# Patient Record
Sex: FEMALE | Race: BLACK OR AFRICAN AMERICAN | Employment: OTHER | URBAN - METROPOLITAN AREA
[De-identification: names, ages, dates, MRNs, and addresses within clinical notes are randomized per-mention and may not be internally consistent; named-entity substitution may affect disease eponyms.]

---

## 2017-06-30 ENCOUNTER — OFFICE VISIT (OUTPATIENT)
Dept: FAMILY MEDICINE CLINIC | Age: 67
End: 2017-06-30

## 2017-06-30 VITALS
OXYGEN SATURATION: 100 % | BODY MASS INDEX: 26.4 KG/M2 | HEIGHT: 63 IN | DIASTOLIC BLOOD PRESSURE: 92 MMHG | SYSTOLIC BLOOD PRESSURE: 162 MMHG | RESPIRATION RATE: 18 BRPM | WEIGHT: 149 LBS | TEMPERATURE: 95.5 F | HEART RATE: 81 BPM

## 2017-06-30 DIAGNOSIS — D23.4 CYST, DERMOID, SCALP AND NECK: ICD-10-CM

## 2017-06-30 DIAGNOSIS — Z00.00 HEALTH CARE MAINTENANCE: ICD-10-CM

## 2017-06-30 DIAGNOSIS — M43.6 TORTICOLLIS, ACUTE: ICD-10-CM

## 2017-06-30 DIAGNOSIS — H91.8X2 OTHER SPECIFIED HEARING LOSS OF LEFT EAR, UNSPECIFIED HEARING STATUS ON CONTRALATERAL SIDE: ICD-10-CM

## 2017-06-30 DIAGNOSIS — I10 ESSENTIAL HYPERTENSION: Primary | ICD-10-CM

## 2017-06-30 DIAGNOSIS — R31.29 MICROSCOPIC HEMATURIA: ICD-10-CM

## 2017-06-30 DIAGNOSIS — E78.2 MIXED HYPERLIPIDEMIA: ICD-10-CM

## 2017-06-30 DIAGNOSIS — E55.9 VITAMIN D INSUFFICIENCY: ICD-10-CM

## 2017-06-30 PROBLEM — K76.0 HEPATIC STEATOSIS: Status: ACTIVE | Noted: 2017-06-30

## 2017-06-30 PROBLEM — H93.11 RIGHT-SIDED TINNITUS: Status: ACTIVE | Noted: 2017-04-19

## 2017-06-30 PROBLEM — K57.90 DIVERTICULAR DISEASE: Status: ACTIVE | Noted: 2017-06-30

## 2017-06-30 PROBLEM — D25.9 UTERINE FIBROID: Status: ACTIVE | Noted: 2017-06-30

## 2017-06-30 PROBLEM — H91.92 HEARING LOSS, LEFT: Status: ACTIVE | Noted: 2017-01-01

## 2017-06-30 PROBLEM — N28.9 RENAL DISEASE: Status: ACTIVE | Noted: 2017-06-30

## 2017-06-30 PROBLEM — K63.5 COLON POLYP: Status: ACTIVE | Noted: 2017-06-30

## 2017-06-30 LAB
BILIRUB UR QL STRIP: NEGATIVE
GLUCOSE UR-MCNC: NEGATIVE MG/DL
KETONES P FAST UR STRIP-MCNC: NEGATIVE MG/DL
PH UR STRIP: 6.5 [PH] (ref 4.6–8)
PROT UR QL STRIP: NEGATIVE MG/DL
SP GR UR STRIP: 1.01 (ref 1–1.03)
UA UROBILINOGEN AMB POC: NORMAL (ref 0.2–1)
URINALYSIS CLARITY POC: CLEAR
URINALYSIS COLOR POC: YELLOW
URINE BLOOD POC: NORMAL
URINE LEUKOCYTES POC: NEGATIVE
URINE NITRITES POC: NEGATIVE

## 2017-06-30 RX ORDER — DIAZEPAM 5 MG/1
TABLET ORAL
Refills: 0 | COMMUNITY
Start: 2017-06-27 | End: 2017-07-28

## 2017-06-30 RX ORDER — LISINOPRIL 10 MG/1
TABLET ORAL
Refills: 1 | COMMUNITY
Start: 2017-04-19 | End: 2017-12-04 | Stop reason: SDUPTHER

## 2017-06-30 RX ORDER — ACETAMINOPHEN 500 MG/1
TABLET, FILM COATED ORAL
Refills: 1 | COMMUNITY
Start: 2017-06-08 | End: 2017-07-28

## 2017-06-30 RX ORDER — HYDROCODONE BITARTRATE AND ACETAMINOPHEN 5; 325 MG/1; MG/1
TABLET ORAL
Refills: 0 | COMMUNITY
Start: 2017-06-19 | End: 2017-07-28

## 2017-06-30 RX ORDER — ACETAMINOPHEN 500 MG
500 TABLET ORAL 2 TIMES DAILY
Qty: 40 TAB | Refills: 0 | Status: SHIPPED | OUTPATIENT
Start: 2017-06-30

## 2017-06-30 RX ORDER — CYCLOBENZAPRINE HCL 10 MG
5-10 TABLET ORAL
Qty: 30 TAB | Refills: 0 | Status: SHIPPED | OUTPATIENT
Start: 2017-06-30 | End: 2017-12-12

## 2017-06-30 RX ORDER — CLINDAMYCIN HYDROCHLORIDE 300 MG/1
CAPSULE ORAL
Refills: 0 | COMMUNITY
Start: 2017-06-16 | End: 2017-06-30

## 2017-06-30 NOTE — PROGRESS NOTES
INTERNAL MEDICINE INITIAL PROVIDER VISIT    SUBJECTIVE:     Chief Complaint   Patient presents with    Establish Care    Stiff Neck       HPI: 79 y.o. female with PMHx significant for HTN is here for the above chief complaint(s). Establish Care: Moving here because of location convenience. Hypertension: Today BP is not controlled, did not lisinopril 10 mg daily. Medication past good compliance. No side effects from medications. Denies headache, lightheadedness, dizziness, vision changes, chest pain, rapid/irregular heart rate, shortness of breath, cough, abdominal pain, leg pain. No h/o leg swelling. Diet and exercises working on with decreased salt. Working on exercise. Neck Pain:  Seen by dentist and had deep cleaning, and abscess in upper maxillary teeth cleaned out 11 days prior. After procedure given antibiotics and norco. A few days later went to gym ldid machines for arms and feet paddling. The following day woke up with neck stiffness on right side and then shifted to left side, constant pain. Worse with neck movements and bending forward. Pain today is a little better compared to day of onset. Improved with norco and cool sensation. ER visit 2 days ago, had CT of head significant for ethmoid disease on left (no facial fullness), diagnosed with torticollis. HCM: Hep C, Tdap vaccine possibly due, will request records. ROS:  · General: negative for - chills, fever, weight changes or malaise, night sweats. · HEENT: negative for- sore throat, nasal congestion,  vision problems or ear problems. · Neck: negative for- neck swelling, trouble swallowing, lymph node enlargement. · Respiratory: negative for- cough, shortness of breath, or wheezing. · Cardiovascular: negative for- chest pain, palpitations, or dyspnea on exertion; no orthopnea or PND. · Gastrointestinal: negative for- abdominal pain, N/V, change in bowel habits,  black or bloody stools, constipation or diarrhea. · Genitourinary: negative for- dysuria, hematuria, frequency, urgency, nocturia, incontinence. · Skin: negative for- new rashes or lesions. · Hematology: negative for- easy bruising or bleeding. · Musculoskeletal: negative for- back pain, joint pain, joint stiffness, muscle pain or muscle weakness. Positive for Neck pain and stiffness  · Neurological: negative for- numbness, tingling, headache or dizziness. Positive for finger tingling with water hot/cold, improved after not in water. No finger color change  · Psychological: negative for - anxiety, depression, sleep disturbances, suicidal or homicidal ideations. Current Outpatient Prescriptions   Medication Sig    MAPAP EXTRA STRENGTH 500 mg tablet TK 1 T PO Q 4 H PRF PAIN    diazePAM (VALIUM) 5 mg tablet TK 1 T PO Q 6 H PRF MUSCLE SPASM FOR UP TO 10 DAYS. NO DRIVING WHILE TAKING THIS MEDICATION    HYDROcodone-acetaminophen (NORCO) 5-325 mg per tablet TK 1 T PO  Q 4 TO 6 H PRN FOR PAIN    lisinopril (PRINIVIL, ZESTRIL) 10 mg tablet TK 1 T PO QD    Calcium-Cholecalciferol, D3, (CALCIUM 600 WITH VITAMIN D3) 600 mg(1,500mg) -400 unit chew Take  by mouth.  COD LIVER OIL, BULK, by Does Not Apply route.  cyclobenzaprine (FLEXERIL) 10 mg tablet Take 0.5-1 Tabs by mouth three (3) times daily as needed for Muscle Spasm(s).  acetaminophen (TYLENOL) 500 mg tablet Take 1 Tab by mouth two (2) times a day.  simvastatin (ZOCOR) 20 mg tablet Take  by mouth nightly. No current facility-administered medications for this visit.       Health Maintenance   Topic Date Due    Hepatitis C Screening  1950    DTaP/Tdap/Td series (1 - Tdap) 06/22/1971    BREAST CANCER SCRN MAMMOGRAM  06/22/2000    ZOSTER VACCINE AGE 60>  06/22/2010    GLAUCOMA SCREENING Q2Y  06/22/2015    OSTEOPOROSIS SCREENING (DEXA)  06/22/2015    Pneumococcal 65+ Low/Medium Risk (1 of 2 - PCV13) 06/22/2015    MEDICARE YEARLY EXAM  06/22/2015    INFLUENZA AGE 9 TO ADULT 08/01/2017    COLONOSCOPY  09/14/2020       Medications and Allergies: Reviewed and confirmed in the chart    Past Medical Hx: Reviewed and confirmed in the chart  Past Medical History:   Diagnosis Date    Environmental allergies     Hearing loss, left 2017    has hearing aid    HLD (hyperlipidemia) 2010    HTN (hypertension)     Microscopic hematuria 2013    Osteoporosis 2014    Vitamin D insufficiency 2010     Family Hx, Surgical Hx, Social Hx: Reviewed and updated in EMR    OBJECTIVE:  Vitals:    06/30/17 0827 06/30/17 0847   BP: (!) 185/95 (!) 162/92   Pulse: 81    Resp: 18    Temp: 95.5 °F (35.3 °C)    TempSrc: Oral    SpO2: 100%    Weight: 149 lb (67.6 kg)    Height: 5' 3\" (1.6 m)      General: Pleasant  woman in no acute distress  HEENT: Head is atraumatic normo-cephalic. Pupils equally round and reactive to light, extraocular muscle intact, conjunctiva clear, non-icterus. Ears bilaterally normal TM, no erythema or exudate, normal light reflex. Nares bilaterally mucosal membranes moist, no erythema. Nasal turbinates non-erythematous, non-boggy. Oral mucosa moist without erythema, ulceration. Fair dentition. At site of previous abscess no pain, swelling, erythema or pain. Neck: Supple, no LAD, no palpable thyromegaly. CVS: No appreciable elevated JVD. Heart regular, rate, and rhythm. Audible S1 and S2. No murmurs, rubs or gallops. PULM: Lungs clear to auscultation bilaterally. No wheezes, rales or rhonchi. MSK:    NECK: ROM limited forward flexion, right side bend, left side bend 2/2 pain. Neck strength 5/5. No TTP c spine or paraspinal muclses. Small cysts inferior left scalp, slight tender to palpation. Bilateral UE: Strength, sensation and reflexes intact upper extremities proximal and distal muscle groups and symmetric  GI: Positive bowel sounds, soft, nondistended, non-tender. EXT: 2+ dorsalis pedis pulses bilaterally.  No pedal edema bilaterally  Neuro: Alert and oriented x 3. Gait wnl. MSE: Mood euthymic, affect congruent       Past records reviewed. Nursing Notes Reviewed       ASSESSMENT AND PLAN    ICD-10-CM ICD-9-CM    1. Essential hypertension I10 401.9 Continue lisinopril 10mg  Discussed diet/exercise  RTC in 4 weeks  YANCI signed for prior PCPs     2. Torticollis, acute M43.6 723.5 C spine XRAY  cyclobenzaprine (FLEXERIL) 10 mg tablet, discussed r/b/se  Cut down on norco      acetaminophen (TYLENOL) 500 mg tablet BID  Exercise handout given  If no improvement considerPT  If no improvement consider cysts removal   3. Microscopic hematuria R31.29 599.72 AMB POC URINALYSIS DIP STICK AUTO W/ MICRO      CYTOLOGY NON-GYN if persistent hematuria   4. Vitamin D insufficiency E55.9 268.9  YANCI signed for old labs   5. Mixed hyperlipidemia E78.2 272.2  YANCI signed for old labs   6. Health care maintenance Z00.00 V70.0  YANCI signed for HCM updates   7. Other specified hearing loss of left ear, unspecified hearing status on contralateral side H91.8X2 389.8  Continue to monitor   8. Cyst, dermoid, scalp and neck D23.4 216.4 Continue to monitor       Orders Placed This Encounter    AMB POC URINALYSIS DIP STICK AUTO W/ MICRO    MAPAP EXTRA STRENGTH 500 mg tablet    DISCONTD: clindamycin (CLEOCIN) 300 mg capsule    diazePAM (VALIUM) 5 mg tablet    HYDROcodone-acetaminophen (NORCO) 5-325 mg per tablet    lisinopril (PRINIVIL, ZESTRIL) 10 mg tablet    Calcium-Cholecalciferol, D3, (CALCIUM 600 WITH VITAMIN D3) 600 mg(1,500mg) -400 unit chew    COD LIVER OIL, BULK,    cyclobenzaprine (FLEXERIL) 10 mg tablet    acetaminophen (TYLENOL) 500 mg tablet     AVS printed and provided to patient    Assessment and plan above discussed with patient, patient voiced understanding and agreement with plan. More than 50% of this 60 min visit was spent counseling the patient face to face about HTN, neck strain/muscle Angelia Young M.D.   [address] [address] [address] [place], [address] [place] [address], [address] [phone]  [place] [phone]

## 2017-06-30 NOTE — PATIENT INSTRUCTIONS
For Neck Pain:  Okay to take norco, try to take 1 to 2 tabs per day  Try to take tylenol 500 mg twice a day for next 2 weeks  Start taking flexeril 5-10 mg as needed for muscle strain, for pain  Do not take vallium  Do exercises below to strengthen your neck  Try to get a massage    We are getting your records. We will order blood test after we receive your records. For blood pressure; Take pills every day  Continue to limit salt in diet, less than 1 gram per 24hrs  Continue to work on exercise after your neck pain improves  Continue to drink water daily, goal 2 liters per day    Return in 4 weeks      Torticollis: Care Instructions  Your Care Instructions  Torticollis is a severe tightness of the muscles on one side of the neck. The tight muscles can make the head turn to one side, lean to one side, or be pulled forward or backward. It is also called wryneck. Your doctor asked questions about your health and examined you. You may also have had X-rays or other tests. If your doctor thinks another medical problem is causing your tight neck muscles, you may need more tests. Torticollis usually gets better with home care. Your doctor may have you take medicine to relieve pain or relax your muscles. He or she may suggest exercise and physical therapy to help increase flexibility and relieve stress. Your doctor may also have you wear a special collar, called a cervical collar, for a day or two. The collar may help make your neck more comfortable. Follow-up care is a key part of your treatment and safety. Be sure to make and go to all appointments, and call your doctor if you are having problems. It's also a good idea to know your test results and keep a list of the medicines you take. How can you care for yourself at home? · Be safe with medicines. Read and follow all instructions on the label. ¨ If the doctor gave you a prescription medicine for pain, take it as prescribed.   ¨ If you are not taking a prescription pain medicine, ask your doctor if you can take an over-the-counter medicine. · Try using a heating pad on a low or medium setting for 15 to 20 minutes every 2 or 3 hours. Try a warm shower in place of one session with the heating pad. · Try using an ice pack for 10 to 15 minutes every 2 to 3 hours. Put a thin cloth between the ice and your skin. · If your doctor recommends a cervical collar, wear it exactly as directed. When should you call for help? Call your doctor now or seek immediate medical care if:  · You have new or worse numbness in your arms, buttocks, or legs. · You have new or worse weakness in your arms or legs. · Your neck pain gets worse. · You lose bladder or bowel control. Watch closely for changes in your health, and be sure to contact your doctor if:  · You do not get better as expected. Where can you learn more? Go to http://james-anirudh.info/. Enter L899 in the search box to learn more about \"Torticollis: Care Instructions. \"  Current as of: March 21, 2017  Content Version: 11.3  © 2845-4173 [employer]. Care instructions adapted under license by [employer] (which disclaims liability or warranty for this information). If you have questions about a medical condition or this instruction, always ask your healthcare professional. [address] any warranty or liability for your use of this information. Neck Strain or Sprain: Rehab Exercises  Your Care Instructions  Here are some examples of typical rehabilitation exercises for your condition. Start each exercise slowly. Ease off the exercise if you start to have pain. Your doctor or physical therapist will tell you when you can start these exercises and which ones will work best for you. How to do the exercises  Neck rotation    1. Sit in a firm chair, or stand up straight.   2. Keeping your chin level, turn your head to the right, and hold for 15 to 30 seconds. 3. Turn your head to the left and hold for 15 to 30 seconds. 4. Repeat 2 to 4 times to each side. Neck stretches    1. Look straight ahead, and tip your right ear to your right shoulder. Do not let your left shoulder rise up as you tip your head to the right. 2. Hold for 15 to 30 seconds. 3. Tilt your head to the left. Do not let your right shoulder rise up as you tip your head to the left. 4. Hold for 15 to 30 seconds. 5. Repeat 2 to 4 times to each side. Forward neck flexion    1. Sit in a firm chair, or stand up straight. 2. Bend your head forward. 3. Hold for 15 to 30 seconds. 4. Repeat 2 to 4 times. Lateral (side) bend strengthening    1. With your right hand, place your first two fingers on your right temple. 2. Start to bend your head to the side while using gentle pressure from your fingers to keep your head from bending. 3. Hold for about 6 seconds. 4. Repeat 8 to 12 times. 5. Switch hands and repeat the same exercise on your left side. Forward bend strengthening    1. Place your first two fingers of either hand on your forehead. 2. Start to bend your head forward while using gentle pressure from your fingers to keep your head from bending. 3. Hold for about 6 seconds. 4. Repeat 8 to 12 times. Neutral position strengthening    1. Using one hand, place your fingertips on the back of your head at the top of your neck. 2. Start to bend your head backward while using gentle pressure from your fingers to keep your head from bending. 3. Hold for about 6 seconds. 4. Repeat 8 to 12 times. Chin tuck    1. Lie on the floor with a rolled-up towel under your neck. Your head should be touching the floor. 2. Slowly bring your chin toward your chest.  3. Hold for a count of 6, and then relax for up to 10 seconds. 4. Repeat 8 to 12 times. Follow-up care is a key part of your treatment and safety.  Be sure to make and go to all appointments, and call your doctor if you are having problems. It's also a good idea to know your test results and keep a list of the medicines you take. Where can you learn more? Go to http://james-anirudh.info/. Enter M679 in the search box to learn more about \"Neck Strain or Sprain: Rehab Exercises. \"  Current as of: March 21, 2017  Content Version: 11.3  © 3937-7703 [employer]. Care instructions adapted under license by [employer] (which disclaims liability or warranty for this information). If you have questions about a medical condition or this instruction, always ask your healthcare professional. [place] [address] any warranty or liability for your use of this information.

## 2017-06-30 NOTE — PROGRESS NOTES
Patient is here establish care with new pcp. She states she had her tooth pulled last week and she states her neck feels funny. 1. Have you been to the ER, urgent care clinic since your last visit? Hospitalized since your last visit? Yes, for stiff neck, patient was seen [facility], care everywhere is open. 2. Have you seen or consulted any other health care providers outside of the [address] [place] [address] since your last visit? Include any pap smears or colon screening.  no

## 2017-06-30 NOTE — MR AVS SNAPSHOT
Visit Information Date & Time Provider Department Dept. Phone Encounter #  
 6/30/2017  8:30 AM Constantine Herzog MD [address] [phone] [id number] Follow-up Instructions Return in about 1 month (around 7/30/2017), or if symptoms worsen or fail to improve, for HTN, neck pain, HCM. Upcoming Health Maintenance Date Due Hepatitis C Screening 1950 DTaP/Tdap/Td series (1 - Tdap) 6/22/1971 BREAST CANCER SCRN MAMMOGRAM 6/22/2000 ZOSTER VACCINE AGE 60> 6/22/2010 GLAUCOMA SCREENING Q2Y 6/22/2015 OSTEOPOROSIS SCREENING (DEXA) 6/22/2015 Pneumococcal 65+ Low/Medium Risk (1 of 2 - PCV13) 6/22/2015 MEDICARE YEARLY EXAM 6/22/2015 INFLUENZA AGE 9 TO ADULT 8/1/2017 COLONOSCOPY 9/14/2020 Allergies as of 6/30/2017  Review Complete On: 6/30/2017 By: Jesús Hugo LPN Severity Noted Reaction Type Reactions Alendronate High 02/23/2016    Anaphylaxis Will get more information, patient reports no h/o allergy Current Immunizations  Never Reviewed No immunizations on file. Not reviewed this visit You Were Diagnosed With   
  
 Codes Comments Essential hypertension    -  Primary ICD-10-CM: I10 
ICD-9-CM: 401.9 Torticollis, acute     ICD-10-CM: M43.6 ICD-9-CM: 723.5 Microscopic hematuria     ICD-10-CM: R31.29 ICD-9-CM: 599.72 Vitamin D insufficiency     ICD-10-CM: E55.9 ICD-9-CM: 268.9 Mixed hyperlipidemia     ICD-10-CM: E78.2 ICD-9-CM: 272.2 Health care maintenance     ICD-10-CM: Z00.00 ICD-9-CM: V70.0 Other specified hearing loss of left ear, unspecified hearing status on contralateral side     ICD-10-CM: H91.8X2 ICD-9-CM: 321. 8 Cyst, dermoid, scalp and neck     ICD-10-CM: D23.4 ICD-9-CM: 216.4 Vitals BP Pulse Temp Resp Height(growth percentile) Weight(growth percentile) (!) 162/92 81 95.5 °F (35.3 °C) (Oral) 18 5' 3\" (1.6 m) 149 lb (67.6 kg) SpO2 BMI OB Status Smoking Status 100% 26.39 kg/m2 Postmenopausal Former Smoker Vitals History BMI and BSA Data Body Mass Index Body Surface Area  
 26.39 kg/m 2 1.73 m 2 Preferred Pharmacy Pharmacy Name Phone Mirian Canales [address], [address] [phone] Your Updated Medication List  
  
   
This list is accurate as of: 6/30/17  9:36 AM.  Always use your most recent med list.  
  
  
  
  
 CALCIUM 600 WITH VITAMIN D3 600 mg(1,500mg) -400 unit Chew Generic drug:  Calcium-Cholecalciferol (D3) Take  by mouth. COD LIVER OIL (BULK)  
by Does Not Apply route. cyclobenzaprine 10 mg tablet Commonly known as:  FLEXERIL Take 0.5-1 Tabs by mouth three (3) times daily as needed for Muscle Spasm(s). diazePAM 5 mg tablet Commonly known as:  VALIUM TK 1 T PO Q 6 H PRF MUSCLE SPASM FOR UP TO 10 DAYS. NO DRIVING WHILE TAKING THIS MEDICATION  
  
 HYDROcodone-acetaminophen 5-325 mg per tablet Commonly known as:  Delilah Safe TK 1 T PO  Q 4 TO 6 H PRN FOR PAIN  
  
 lisinopril 10 mg tablet Commonly known as:  Rich[place] Moulder TK 1 T PO QD  
  
 MAPAP EXTRA STRENGTH 500 mg tablet Generic drug:  acetaminophen TK 1 T PO Q 4 H PRF PAIN  
  
 simvastatin 20 mg tablet Commonly known as:  ZOCOR Take  by mouth nightly. Prescriptions Sent to Pharmacy Refills  
 cyclobenzaprine (FLEXERIL) 10 mg tablet 0 Sig: Take 0.5-1 Tabs by mouth three (3) times daily as needed for Muscle Spasm(s). Class: Normal  
 Pharmacy: [facility] [address], [address] Ph #: [phone] Route: Oral  
  
We Performed the Following AMB POC URINALYSIS DIP STICK AUTO W/ MICRO [30076 CPT(R)] Follow-up Instructions  Return in about 1 month (around 7/30/2017), or if symptoms worsen or fail to improve, for HTN, neck pain, HCM. Patient Instructions For Neck Pain: 
Okay to take norco, try to take 1 to 2 tabs per day Try to take tylenol 500 mg twice a day for next 2 weeks Start taking flexeril 5-10 mg as needed for muscle strain, for pain Do not take vallium Do exercises below to strengthen your neck Try to get a massage We are getting your records. We will order blood test after we receive your records. For blood pressure; Take pills every day Continue to limit salt in diet, less than 1 gram per 24hrs Continue to work on exercise after your neck pain improves Continue to drink water daily, goal 2 liters per day Return in 4 weeks Torticollis: Care Instructions Your Care Instructions Torticollis is a severe tightness of the muscles on one side of the neck. The tight muscles can make the head turn to one side, lean to one side, or be pulled forward or backward. It is also called wryneck. Your doctor asked questions about your health and examined you. You may also have had X-rays or other tests. If your doctor thinks another medical problem is causing your tight neck muscles, you may need more tests. Torticollis usually gets better with home care. Your doctor may have you take medicine to relieve pain or relax your muscles. He or she may suggest exercise and physical therapy to help increase flexibility and relieve stress. Your doctor may also have you wear a special collar, called a cervical collar, for a day or two. The collar may help make your neck more comfortable. Follow-up care is a key part of your treatment and safety. Be sure to make and go to all appointments, and call your doctor if you are having problems. It's also a good idea to know your test results and keep a list of the medicines you take. How can you care for yourself at home? · Be safe with medicines. Read and follow all instructions on the label. ¨ If the doctor gave you a prescription medicine for pain, take it as prescribed. ¨ If you are not taking a prescription pain medicine, ask your doctor if you can take an over-the-counter medicine. · Try using a heating pad on a low or medium setting for 15 to 20 minutes every 2 or 3 hours. Try a warm shower in place of one session with the heating pad. · Try using an ice pack for 10 to 15 minutes every 2 to 3 hours. Put a thin cloth between the ice and your skin. · If your doctor recommends a cervical collar, wear it exactly as directed. When should you call for help? Call your doctor now or seek immediate medical care if: 
· You have new or worse numbness in your arms, buttocks, or legs. · You have new or worse weakness in your arms or legs. · Your neck pain gets worse. · You lose bladder or bowel control. Watch closely for changes in your health, and be sure to contact your doctor if: 
· You do not get better as expected. Where can you learn more? Go to http://james-anirudh.info/. Enter M421 in the search box to learn more about \"Torticollis: Care Instructions. \" Current as of: March 21, 2017 Content Version: 11.3 © 9766-4669 MST. Care instructions adapted under license by [employer] (which disclaims liability or warranty for this information). If you have questions about a medical condition or this instruction, always ask your healthcare professional. [place] [address] any warranty or liability for your use of this information. Neck Strain or Sprain: Rehab Exercises Your Care Instructions Here are some examples of typical rehabilitation exercises for your condition. Start each exercise slowly. Ease off the exercise if you start to have pain. Your doctor or physical therapist will tell you when you can start these exercises and which ones will work best for you. How to do the exercises Neck rotation 1. Sit in a firm chair, or stand up straight. 2. Keeping your chin level, turn your head to the right, and hold for 15 to 30 seconds. 3. Turn your head to the left and hold for 15 to 30 seconds. 4. Repeat 2 to 4 times to each side. Neck stretches 1. Look straight ahead, and tip your right ear to your right shoulder. Do not let your left shoulder rise up as you tip your head to the right. 2. Hold for 15 to 30 seconds. 3. Tilt your head to the left. Do not let your right shoulder rise up as you tip your head to the left. 4. Hold for 15 to 30 seconds. 5. Repeat 2 to 4 times to each side. Forward neck flexion 1. Sit in a firm chair, or stand up straight. 2. Bend your head forward. 3. Hold for 15 to 30 seconds. 4. Repeat 2 to 4 times. Lateral (side) bend strengthening 1. With your right hand, place your first two fingers on your right temple. 2. Start to bend your head to the side while using gentle pressure from your fingers to keep your head from bending. 3. Hold for about 6 seconds. 4. Repeat 8 to 12 times. 5. Switch hands and repeat the same exercise on your left side. Forward bend strengthening 1. Place your first two fingers of either hand on your forehead. 2. Start to bend your head forward while using gentle pressure from your fingers to keep your head from bending. 3. Hold for about 6 seconds. 4. Repeat 8 to 12 times. Neutral position strengthening 1. Using one hand, place your fingertips on the back of your head at the top of your neck. 2. Start to bend your head backward while using gentle pressure from your fingers to keep your head from bending. 3. Hold for about 6 seconds. 4. Repeat 8 to 12 times. Chin tuck 1. Lie on the floor with a rolled-up towel under your neck. Your head should be touching the floor. 2. Slowly bring your chin toward your chest. 
3. Hold for a count of 6, and then relax for up to 10 seconds. 4. Repeat 8 to 12 times. Follow-up care is a key part of your treatment and safety. Be sure to make and go to all appointments, and call your doctor if you are having problems. It's also a good idea to know your test results and keep a list of the medicines you take. Where can you learn more? Go to http://james-anirudh.info/. Enter M679 in the search box to learn more about \"Neck Strain or Sprain: Rehab Exercises. \" Current as of: March 21, 2017 Content Version: 11.3 © 1069-5075 [employer]. Care instructions adapted under license by [employer] (which disclaims liability or warranty for this information). If you have questions about a medical condition or this instruction, always ask your healthcare professional. [address] any warranty or liability for your use of this information. Introducing [facility]! [employer] introduces Vidavee patient portal. Now you can access parts of your medical record, email your doctor's office, and request medication refills online. 1. In your internet browser, go to [email or web address] 2. Click on the First Time User? Click Here link in the Sign In box. You will see the New Member Sign Up page. 3. Enter your Vidavee Access Code exactly as it appears below. You will not need to use this code after youve completed the sign-up process. If you do not sign up before the expiration date, you must request a new code. · Vidavee Access Code: [email or web address] Expires: 9/28/2017  9:33 AM 
 
4. Enter the last four digits of your Social Security Number (xxxx) and Date of Birth (mm/dd/yyyy) as indicated and click Submit. You will be taken to the next sign-up page. 5. Create a [employer] ID. This will be your Vidavee login ID and cannot be changed, so think of one that is secure and easy to remember. 6. Create a [employer] password. You can change your password at any time. 7. Enter your Password Reset Question and Answer. This can be used at a later time if you forget your password. 8. Enter your e-mail address. You will receive e-mail notification when new information is available in [address] 9. Click Sign Up. You can now view and download portions of your medical record. 10. Click the Download Summary menu link to download a portable copy of your medical information. If you have questions, please visit the Frequently Asked Questions section of the [employer] website. Remember, [employer] is NOT to be used for urgent needs. For medical emergencies, dial 911. Now available from your iPhone and Android! Please provide this summary of care documentation to your next provider. Your primary care clinician is listed as Xiang Bullard. If you have any questions after today's visit, please call [phone].

## 2017-07-05 ENCOUNTER — TELEPHONE (OUTPATIENT)
Dept: FAMILY MEDICINE CLINIC | Age: 67
End: 2017-07-05

## 2017-07-05 DIAGNOSIS — M50.30 DDD (DEGENERATIVE DISC DISEASE), CERVICAL: ICD-10-CM

## 2017-07-05 NOTE — TELEPHONE ENCOUNTER
Called to discuss spinal xray cervical spine. Discussed below. All questions answered. 6/30/2017 C-SPINE XRAY IMPRESSION:  1. No acute bony abnormality. 2. Cervical spondylosis without significant foraminal narrowing. 3. Mild anterolisthesis C4 on C5 probably secondary to facet degenerative changes. Neck pain is getting better. Discussed above.  Recommended continue exercise and tylenol, rare norco.

## 2017-07-07 ENCOUNTER — TELEPHONE (OUTPATIENT)
Dept: FAMILY MEDICINE CLINIC | Age: 67
End: 2017-07-07

## 2017-07-07 NOTE — TELEPHONE ENCOUNTER
Pt requesting insurance referral to [facility]. Pt states was seen yesterday 07/06/17 for initial appt and was told will need an insurance referral for follow up. Pt provided info: [facility] [address], suit [address], [address] ph# [phone], Dr Marylin Go [phone]. I cAlled Melissa Leal for additional info for referral request. [employer] referral was completed and faxed to [phone]. Confirmation received.  [employer] referral info scanned into media

## 2017-07-28 ENCOUNTER — OFFICE VISIT (OUTPATIENT)
Dept: FAMILY MEDICINE CLINIC | Age: 67
End: 2017-07-28

## 2017-07-28 VITALS
HEART RATE: 90 BPM | WEIGHT: 160.8 LBS | OXYGEN SATURATION: 98 % | HEIGHT: 63 IN | SYSTOLIC BLOOD PRESSURE: 142 MMHG | BODY MASS INDEX: 28.49 KG/M2 | DIASTOLIC BLOOD PRESSURE: 90 MMHG | RESPIRATION RATE: 18 BRPM | TEMPERATURE: 97.3 F

## 2017-07-28 DIAGNOSIS — E16.2 HYPOGLYCEMIA: ICD-10-CM

## 2017-07-28 DIAGNOSIS — R55 PRE-SYNCOPE: ICD-10-CM

## 2017-07-28 DIAGNOSIS — I10 ESSENTIAL HYPERTENSION: Primary | ICD-10-CM

## 2017-07-28 DIAGNOSIS — E78.5 HYPERLIPIDEMIA, UNSPECIFIED HYPERLIPIDEMIA TYPE: ICD-10-CM

## 2017-07-28 DIAGNOSIS — E55.9 VITAMIN D INSUFFICIENCY: ICD-10-CM

## 2017-07-28 DIAGNOSIS — K76.0 HEPATIC STEATOSIS: ICD-10-CM

## 2017-07-28 DIAGNOSIS — Z12.39 SCREENING FOR BREAST CANCER: ICD-10-CM

## 2017-07-28 DIAGNOSIS — R31.29 MICROSCOPIC HEMATURIA: ICD-10-CM

## 2017-07-28 DIAGNOSIS — M50.30 DDD (DEGENERATIVE DISC DISEASE), CERVICAL: ICD-10-CM

## 2017-07-28 LAB
BILIRUB UR QL STRIP: NEGATIVE
GLUCOSE UR-MCNC: NEGATIVE MG/DL
KETONES P FAST UR STRIP-MCNC: NEGATIVE MG/DL
PH UR STRIP: 7 [PH] (ref 4.6–8)
PROT UR QL STRIP: NEGATIVE MG/DL
SP GR UR STRIP: 1.01 (ref 1–1.03)
UA UROBILINOGEN AMB POC: NORMAL (ref 0.2–1)
URINALYSIS CLARITY POC: CLEAR
URINALYSIS COLOR POC: YELLOW
URINE BLOOD POC: NORMAL
URINE LEUKOCYTES POC: NORMAL
URINE NITRITES POC: NEGATIVE

## 2017-07-28 RX ORDER — INSULIN PUMP SYRINGE, 3 ML
EACH MISCELLANEOUS
Qty: 1 KIT | Refills: 0 | Status: SHIPPED | OUTPATIENT
Start: 2017-07-28 | End: 2018-05-23

## 2017-07-28 RX ORDER — MAGNESIUM SULFATE 100 %
15 CRYSTALS MISCELLANEOUS AS NEEDED
Qty: 30 TAB | Refills: 1 | Status: SHIPPED | OUTPATIENT
Start: 2017-07-28 | End: 2017-12-12

## 2017-07-28 RX ORDER — LANCETS
EACH MISCELLANEOUS
Qty: 1 EACH | Refills: 0 | Status: SHIPPED | OUTPATIENT
Start: 2017-07-28 | End: 2018-05-23

## 2017-07-28 NOTE — PROGRESS NOTES
Internal Medicine Follow Up Visit Note    Chief Complaint   Patient presents with    Hypertension    Neck Pain       HPI:  Molly Rodriguez is a 79 y.o.  female with history significant for HTN, DDD, microscopic hematuria is here for the above complaint(s). Neck Pain: Cervical muscle strain dx last visit, xray c spine with acute abnormalities, DDD present. Improved from last visit but much better. Did get a referral per paitent but from unknown clinician. Pt will look into who placed referral given clinician writing did not. Currently pain is 1/10. Taking tylenol 500mg for pain. Flexeril rare. HTN: BP elevated today but improved compared to last visit. Reports she just took isinopril 10mg. Eura Gregorio No chest pain,rapid heart beats, leg swelling, shortness of breath, LH or dizziness. Taking lisinopril regular since last visit. Pre-Syncope: No food, went to mall, felt like about to pass out, disoriented. Was drinking water. Did eat something afterwards and felt better. Has occurred 2x in past  2 years. No preceding chest pain, rapid heart beats or shortness of breath. No current LH or dizziness. Last meal today right before visit at [address] [place] [address].     Microscopic Hematuria: Denies dysuria, change in urinary patterns or visible blood in urine. No fevers or chills, lower abdominal pain. Needs referral to urogyn for repeat cystoscopy and cytology. ROS: as per HPI      Current Outpatient Prescriptions   Medication Sig    Blood-Glucose Meter monitoring kit Use to test sugar when symptoms occur    Lancets misc Use when symptoms occur    glucose blood VI test strips (ASCENSIA AUTODISC VI, ONE TOUCH ULTRA TEST VI) strip Use when symptoms occur    glucose 4 gram chewable tablet Take 4 Tabs by mouth as needed. When symptoms occur    lisinopril (PRINIVIL, ZESTRIL) 10 mg tablet TK 1 T PO QD    Calcium-Cholecalciferol, D3, (CALCIUM 600 WITH VITAMIN D3) 600 mg(1,500mg) -400 unit chew Take  by mouth.     COD LIVER OIL, BULK, by Does Not Apply route.  cyclobenzaprine (FLEXERIL) 10 mg tablet Take 0.5-1 Tabs by mouth three (3) times daily as needed for Muscle Spasm(s).  acetaminophen (TYLENOL) 500 mg tablet Take 1 Tab by mouth two (2) times a day.  simvastatin (ZOCOR) 20 mg tablet Take  by mouth nightly. No current facility-administered medications for this visit. Health Maintenance   Topic Date Due    DTaP/Tdap/Td series (1 - Tdap) 06/22/1971    BREAST CANCER SCRN MAMMOGRAM  06/22/2000    ZOSTER VACCINE AGE 60>  04/22/2010    MEDICARE YEARLY EXAM  06/22/2015    Pneumococcal 65+ Low/Medium Risk (2 of 2 - PCV13) 09/14/2016    INFLUENZA AGE 9 TO ADULT  08/01/2017    GLAUCOMA SCREENING Q2Y  02/01/2019    COLONOSCOPY  09/14/2020    Hepatitis C Screening  Completed    OSTEOPOROSIS SCREENING (DEXA)  Completed     Immunization History   Administered Date(s) Administered    Influenza Vaccine 09/14/2015    Pneumococcal Polysaccharide (PPSV-23) 09/14/2015       Allergies and Medications: Reviewed and updated in EMR. Patient Active Problem List   Diagnosis Code    Microscopic hematuria R31.29    HLD (hyperlipidemia) E78.5    Vitamin D insufficiency E55.9    HTN (hypertension) I10    Uterine fibroid D25.9    Renal disease N28.9    Hepatic steatosis K76.0    Health care maintenance Z00.00    Colon polyp K63.5    Diverticular disease K57.90    Osteoporosis M81.0    Hearing loss, left H91.92    Environmental allergies Z91.09    Cyst, dermoid, scalp and neck D23.4    Bilateral carpal tunnel syndrome G56.03    Primary localized osteoarthrosis, hand M19.049    Right-sided tinnitus H93.11    DDD (degenerative disc disease), cervical M50.30     Family History, Social History, Past Medical History, Surgical History: Reviewed and updated in EMR as appropriate.       OBJECTIVE:   Visit Vitals    /90    Pulse 90    Temp 97.3 °F (36.3 °C) (Oral)    Resp 18    Ht 5' 3\" (1.6 m)    Wt 160 lb 12.8 oz (72.9 kg)    SpO2 98%    BMI 28.48 kg/m2   General: elderly adult woman in no acute distress  HEENT: Head is atraumatic normo-cephalic. Pupils equally round and reactive to light, extraocular muscle intact, conjunctiva clear, non-icterus. CVS: Heart regular, rate, and rhythm. Audible S1 and S2. No murmurs, rubs or gallops. PULM: Lungs clear to auscultation bilaterally. No wheezes, rales or rhonchi. GI: Positive bowel sounds, soft, nondistended, non-tender. EXT: 2+ dorsalis pedis pulses bilaterally. No pedal edema bilaterally  Neuro: Alert and oriented x3. Gait wnl. MSE: Mood euthymic affect congruent. Nursing Notes Reviewed. LABS/RADIOLOGICAL TESTS:    2/2013:CYSTOSCOPY PROCEDURE, Impression: Microscopic hematuria, repeat urine cytology in 1 year (due 2/2014)  1/2013 Cytology - normal, negative for malignancy, reactive urothelial cells    Lab Results   Component Value Date/Time    WBC 4.8 06/21/2010 10:24 AM    HGB 13.4 06/21/2010 10:24 AM    HCT 40.2 06/21/2010 10:24 AM    PLATELET 577 65/81/7081 10:24 AM     Lab Results   Component Value Date/Time    Sodium 139 05/10/2010 10:08 AM    Potassium 3.8 05/10/2010 10:08 AM    Chloride 101 05/10/2010 10:08 AM    CO2 31 05/10/2010 10:08 AM    Glucose 101 05/10/2010 10:08 AM    BUN 12 05/10/2010 10:08 AM    Creatinine 0.7 05/10/2010 10:08 AM     Lab Results   Component Value Date/Time    Cholesterol, total 228 05/10/2010 10:08 AM    HDL Cholesterol 57 05/10/2010 10:08 AM    LDL, calculated 160.6 05/10/2010 10:08 AM    Triglyceride 52 05/10/2010 10:08 AM     All lab results and radiological studies were reviewed and discussed with the patient. ASSESSMENT/PLAN:      ICD-10-CM ICD-9-CM    1. Essential hypertension L61 111.5 METABOLIC PANEL, COMPREHENSIVE  BP log  RTC in 4 weeks  Continue lisinopril   2. Vitamin D insufficiency E55.9 268.9 VITAMIN D, 25 HYDROXY  Continue supplements   3.  Screening for breast cancer Z12.39 V76.10 JEFF MAMMO BI SCREENING INCL CAD   4. Microscopic hematuria R31.29 599.72 AMB POC URINALYSIS DIP STICK AUTO W/ MICRO      REFERRAL TO UROGYNECOLOGY   5. DDD (degenerative disc disease), cervical M50.30 722.4 Continue current mgmt   6. Hepatic steatosis N85.6 800.2 METABOLIC PANEL, COMPREHENSIVE  Discuss hep a/b vaccine next visit   7. Pre-syncope: orthostatic negative, suspect hypoglycemia given history R55 780.2 Confirm with below  Eat small frequent meals   8. Hypoglycemia E16.2 251.2 Blood-Glucose Meter monitoring kit      Lancets misc      glucose blood VI test strips (ASCENSIA AUTODISC VI, ONE TOUCH ULTRA TEST VI) strip      glucose 4 gram chewable tablet       Requested Prescriptions     Signed Prescriptions Disp Refills    Blood-Glucose Meter monitoring kit 1 Kit 0     Sig: Use to test sugar when symptoms occur    Lancets misc 1 Each 0     Sig: Use when symptoms occur    glucose blood VI test strips (ASCENSIA AUTODISC VI, ONE TOUCH ULTRA TEST VI) strip 100 Strip 0     Sig: Use when symptoms occur    glucose 4 gram chewable tablet 30 Tab 1     Sig: Take 4 Tabs by mouth as needed. When symptoms occur     Patient verbalized understanding and agreement with the plan. Patient was given an after-visit summary. Follow-up Disposition: Return in about 4 weeks (around 8/25/2017), or if symptoms worsen or fail to improve, for HTN, MWV. or sooner if worsening symptoms. More than 50% of this 25 min visit was spent counseling the patient face to face about HTN, hematuria, neck pain      Tanner Coon M.D.   [address], [address] [place] [address], [address] [place] [address] [phone] [place] [address] [phone]

## 2017-07-28 NOTE — PATIENT INSTRUCTIONS
FOR WEAKNESS EPISODES:  Most likely related to low sugar  Start to eat a small meal every 3-4 hrs to avoid symptos  If symptoms occur check sugar with glucmeter, lancet and test strips---if pharmacy states unable to fill because of prior auth need, call office to inform  Take glucose tablets as prescribed for sugar <70 or if symptoms occur and you haven't eaten in 3-4 hrs  Return in 4 weeks for follow up    FOR HYPERTENSION:  Continue lisinopril  Check BP 3x per week and write number down, check around same time each day and after relaxing for a few minutes    FOR BLOOD IN URINE:  We are sending you to urogynecologist for work up, you should be called to schedule this visit    We have ordered a mammogram and you should be called to schedule this  Let us know if you still need the referral to chiropractor after your visit today, we will place if needed      RETURN IN AM FOR BLOOD DRAW, [address]    We will let you know the results of your blood and urine test when they come in.  We will call if abnormal and send a letter if normal.

## 2017-07-28 NOTE — PROGRESS NOTES
Patient to return to have AM fasting blood work completed  -Lipid panel  -Vitamin D  -CMP    forwarded to nursing staff as Hugo Kaye

## 2017-07-28 NOTE — PROGRESS NOTES
Neelam Dela Cruz is a 79 y.o. female presents to office for HTN and neck pain      1.  Have you been to the ER, urgent care clinic or hospitalized since your last visit? no          Health Maintenance items with a due date reviewed with patient:  Health Maintenance Due   Topic Date Due    Hepatitis C Screening  1950    DTaP/Tdap/Td series (1 - Tdap) 06/22/1971    BREAST CANCER SCRN MAMMOGRAM  06/22/2000    ZOSTER VACCINE AGE 60>  04/22/2010    GLAUCOMA SCREENING Q2Y  06/22/2015    MEDICARE YEARLY EXAM  06/22/2015    Pneumococcal 65+ Low/Medium Risk (2 of 2 - PCV13) 09/14/2016

## 2017-07-28 NOTE — MR AVS SNAPSHOT
Visit Information Date & Time Provider Department Dept. Phone Encounter #  
 7/28/2017  8:30 AM Deisy Duggan MD [address] [phone] [id number] Follow-up Instructions Return in about 4 weeks (around 8/25/2017), or if symptoms worsen or fail to improve, for HTN, MWV. Upcoming Health Maintenance Date Due Hepatitis C Screening 1950 DTaP/Tdap/Td series (1 - Tdap) 6/22/1971 BREAST CANCER SCRN MAMMOGRAM 6/22/2000 ZOSTER VACCINE AGE 60> 4/22/2010 GLAUCOMA SCREENING Q2Y 6/22/2015 MEDICARE YEARLY EXAM 6/22/2015 Pneumococcal 65+ Low/Medium Risk (2 of 2 - PCV13) 9/14/2016 INFLUENZA AGE 9 TO ADULT 8/1/2017 COLONOSCOPY 9/14/2020 Allergies as of 7/28/2017  Review Complete On: 7/28/2017 By: Virginia Oliveira LPN Severity Noted Reaction Type Reactions Alendronate High 02/23/2016    Anaphylaxis Will get more information, patient reports no h/o allergy Current Immunizations  Never Reviewed Name Date Influenza Vaccine 9/14/2015 12:00 AM  
 Pneumococcal Polysaccharide (PPSV-23) 9/14/2015 12:00 AM  
  
 Not reviewed this visit You Were Diagnosed With   
  
 Codes Comments Essential hypertension    -  Primary ICD-10-CM: I10 
ICD-9-CM: 401.9 Vitamin D insufficiency     ICD-10-CM: E55.9 ICD-9-CM: 268.9 Screening for breast cancer     ICD-10-CM: Z12.39 
ICD-9-CM: V76.10 Microscopic hematuria     ICD-10-CM: R31.29 ICD-9-CM: 599.72   
 DDD (degenerative disc disease), cervical     ICD-10-CM: M50.30 ICD-9-CM: 722.4 Hepatic steatosis     ICD-10-CM: K76.0 ICD-9-CM: 571.8 Pre-syncope     ICD-10-CM: R55 
ICD-9-CM: 780.2 Hypoglycemia     ICD-10-CM: E16.2 ICD-9-CM: 251.2 Vitals BP Pulse Temp Resp Height(growth percentile) Weight(growth percentile) 142/90 90 97.3 °F (36.3 °C) (Oral) 18 5' 3\" (1.6 m) 160 lb 12.8 oz (72.9 kg) SpO2 BMI OB Status Smoking Status 98% 28.48 kg/m2 Postmenopausal Former Smoker Vitals History BMI and BSA Data Body Mass Index Body Surface Area  
 28.48 kg/m 2 1.8 m 2 Preferred Pharmacy Pharmacy Name Phone [facility] [address], [address] [facility] [address] [facility] [phone] Your Updated Medication List  
  
   
This list is accurate as of: 7/28/17  9:20 AM.  Always use your most recent med list.  
  
  
  
  
 Blood-Glucose Meter monitoring kit Use to test sugar when symptoms occur CALCIUM 600 WITH VITAMIN D3 600 mg(1,500mg) -400 unit Chew Generic drug:  Calcium-Cholecalciferol (D3) Take  by mouth. COD LIVER OIL (BULK)  
by Does Not Apply route. cyclobenzaprine 10 mg tablet Commonly known as:  FLEXERIL Take 0.5-1 Tabs by mouth three (3) times daily as needed for Muscle Spasm(s). diazePAM 5 mg tablet Commonly known as:  VALIUM TK 1 T PO Q 6 H PRF MUSCLE SPASM FOR UP TO 10 DAYS. NO DRIVING WHILE TAKING THIS MEDICATION  
  
 glucose 4 gram chewable tablet Take 4 Tabs by mouth as needed. When symptoms occur  
  
 glucose blood VI test strips strip Commonly known as:  ASCENSIA AUTODISC VI, ONE TOUCH ULTRA TEST VI  
Use when symptoms occur HYDROcodone-acetaminophen 5-325 mg per tablet Commonly known as:  Hernando Uehling TK 1 T PO  Q 4 TO 6 H PRN FOR PAIN Lancets Misc Use when symptoms occur  
  
 lisinopril 10 mg tablet Commonly known as:  Tyra Dallas TK 1 T PO QD  
  
 * MAPAP EXTRA STRENGTH 500 mg tablet Generic drug:  acetaminophen TK 1 T PO Q 4 H PRF PAIN  
  
 * acetaminophen 500 mg tablet Commonly known as:  TYLENOL Take 1 Tab by mouth two (2) times a day. simvastatin 20 mg tablet Commonly known as:  ZOCOR Take  by mouth nightly. * Notice: This list has 2 medication(s) that are the same as other medications prescribed for you.  Read the directions carefully, and ask your doctor or other care provider to review them with you. Prescriptions Sent to Pharmacy Refills Blood-Glucose Meter monitoring kit 0 Sig: Use to test sugar when symptoms occur Class: Normal  
 Pharmacy: [facility] [address], [address] Ph #: [phone] Lancets misc 0 Sig: Use when symptoms occur Class: Normal  
 Pharmacy: [facility] Ph #: [phone]  
 glucose blood VI test strips (ASCENSIA AUTODISC VI, ONE TOUCH ULTRA TEST VI) strip 0 Sig: Use when symptoms occur Class: Normal  
 Pharmacy: [facility] [address] Ph #: [phone]  
 glucose 4 gram chewable tablet 1 Sig: Take 4 Tabs by mouth as needed. When symptoms occur Class: Normal  
 Pharmacy: [facility] [address], [address] Ph #: [phone] Route: Oral  
  
We Performed the Following AMB POC URINALYSIS DIP STICK AUTO W/ MICRO [76078 CPT(R)] REFERRAL TO UROGYNECOLOGY [id number] CPT(R)] Comments:  
 Please evaluate patient for h/o chronic hematuria, microscopic, last cystoscopy 2013. 
2/2013:CYSTOSCOPY PROCEDURE, Impression: Microscopic hematuria, repeat urine cytology in 1 year (due 2/2014) 
1/2013 Cytology - normal, negative for malignancy, reactive urothelial cells [employer] Follow-up Instructions Return in about 4 weeks (around 8/25/2017), or if symptoms worsen or fail to improve, for HTN, MWV. To-Do List   
 07/28/2017 Imaging:  [facility] MAMMO BI SCREENING INCL CAD Referral Information Referral ID Referred By Referred To  
  
 [id number] Leatha Urbano Not Available Visits Status Start Date End Date 1 New Request 7/28/17 7/28/18 If your referral has a status of pending review or denied, additional information will be sent to support the outcome of this decision. Patient Instructions FOR WEAKNESS EPISODES: 
Most likely related to low sugar Start to eat a small meal every 3-4 hrs to avoid symptos If symptoms occur check sugar with glucmeter, lancet and test strips---if pharmacy states unable to fill because of prior auth need, call office to inform Take glucose tablets as prescribed for sugar <70 or if symptoms occur and you haven't eaten in 3-4 hrs Return in 4 weeks for follow up FOR HYPERTENSION: 
Continue lisinopril Check BP 3x per week and write number down, check around same time each day and after relaxing for a few minutes FOR BLOOD IN URINE: 
We are sending you to urogynecologist for work up, you should be called to schedule this visit We have ordered a mammogram and you should be called to schedule this Let us know if you still need the referral to chiropractor after your visit today, we will place if needed We will let you know the results of your blood and urine test when they come in. We will call if abnormal and send a letter if normal. 
 
 
 
 
 
 
 
  
Introducing [facility]! Jake Martinez introduces [employer] patient portal. Now you can access parts of your medical record, email your doctor's office, and request medication refills online. 1. In your internet browser, go to [email or web address] "[email or web address] 2. Click on the First Time User? Click Here link in the Sign In box. You will see the New Member Sign Up page. 3. Enter your [employer] Access Code exactly as it appears below. You will not need to use this code after youve completed the sign-up process. If you do not sign up before the expiration date, you must request a new code. · [employer] Access Code: [email or web address] Expires: 9/28/2017  9:33 AM 
 
 4. Enter the last four digits of your Social Security Number (xxxx) and Date of Birth (mm/dd/yyyy) as indicated and click Submit. You will be taken to the next sign-up page. 5. Create a Uniweb.ru ID. This will be your [employer] login ID and cannot be changed, so think of one that is secure and easy to remember. 6. Create a Uni[employer]ru password. You can change your password at any time. 7. Enter your Password Reset Question and Answer. This can be used at a later time if you forget your password. 8. Enter your e-mail address. You will receive e-mail notification when new information is available in [address] 9. Click Sign Up. You can now view and download portions of your medical record. 10. Click the Download Summary menu link to download a portable copy of your medical information. If you have questions, please visit the Frequently Asked Questions section of the Uniweb.ru website. Remember, [employer] is NOT to be used for urgent needs. For medical emergencies, dial 911. Now available from your iPhone and Android! Please provide this summary of care documentation to your next provider. Your primary care clinician is listed as Angela Strong. If you have any questions after today's visit, please call [phone].

## 2017-07-31 ENCOUNTER — TELEPHONE (OUTPATIENT)
Dept: FAMILY MEDICINE CLINIC | Age: 67
End: 2017-07-31

## 2017-07-31 PROBLEM — R80.9 MICROALBUMINURIA: Status: ACTIVE | Noted: 2017-07-31

## 2017-07-31 NOTE — TELEPHONE ENCOUNTER
Called to clarify lancets Rx. Per pharmacist likely not covered. Lancets covered by insurance, No charge per pharmacy. Juan Coe M.D.   [address], [address] [place] [address] [phone]  [facility] [phone]

## 2017-07-31 NOTE — TELEPHONE ENCOUNTER
Daniel with [facility]'s pharmacy requesting clarification on rx for lancets. States need directions.

## 2017-08-02 ENCOUNTER — TELEPHONE (OUTPATIENT)
Dept: FAMILY MEDICINE CLINIC | Age: 67
End: 2017-08-02

## 2017-08-02 ENCOUNTER — LAB ONLY (OUTPATIENT)
Dept: FAMILY MEDICINE CLINIC | Age: 67
End: 2017-08-02

## 2017-08-02 DIAGNOSIS — Z01.89 ROUTINE LAB DRAW: Primary | ICD-10-CM

## 2017-08-02 NOTE — TELEPHONE ENCOUNTER
Sadia requesting Clinical notes for pt to obtain auth. States please call direct line [phone] or [phone] general line if you have any further questions. [address] provided fax # [phone].

## 2017-08-03 LAB
CHOLEST SERPL-MCNC: 203 MG/DL (ref 100–199)
HDLC SERPL-MCNC: 65 MG/DL
LDLC SERPL CALC-MCNC: 125 MG/DL (ref 0–99)
SPECIMEN STATUS REPORT, ROLRST: NORMAL
TRIGL SERPL-MCNC: 66 MG/DL (ref 0–149)
VLDLC SERPL CALC-MCNC: 13 MG/DL (ref 5–40)

## 2017-08-08 ENCOUNTER — TELEPHONE (OUTPATIENT)
Dept: BEHAVIORAL/MENTAL HEALTH CLINIC | Age: 67
End: 2017-08-08

## 2017-08-08 ENCOUNTER — TELEPHONE (OUTPATIENT)
Dept: FAMILY MEDICINE CLINIC | Age: 67
End: 2017-08-08

## 2017-08-08 DIAGNOSIS — E78.5 HYPERLIPIDEMIA, UNSPECIFIED HYPERLIPIDEMIA TYPE: Primary | ICD-10-CM

## 2017-08-08 RX ORDER — ATORVASTATIN CALCIUM 10 MG/1
10 TABLET, FILM COATED ORAL DAILY
Qty: 30 TAB | Refills: 2 | Status: SHIPPED | OUTPATIENT
Start: 2017-08-08 | End: 2017-11-06

## 2017-08-08 NOTE — TELEPHONE ENCOUNTER
Called to discuss elevated cholesterol. Not taking simvastatin. Last taken >9 months ago. Simvastatin caused racing heart beats. - start lipitor 10mg daily  Discussed r/b/se pt voiced understanding and agreement with unique Felton M.D.   [place] [phone]  [facility] [phone]

## 2017-08-09 ENCOUNTER — TELEPHONE (OUTPATIENT)
Dept: FAMILY MEDICINE CLINIC | Age: 67
End: 2017-08-09

## 2017-08-09 ENCOUNTER — HOSPITAL ENCOUNTER (OUTPATIENT)
Dept: MAMMOGRAPHY | Age: 67
Discharge: HOME | End: 2017-08-09
Attending: INTERNAL MEDICINE
Payer: MEDICARE

## 2017-08-09 DIAGNOSIS — Z12.39 SCREENING FOR BREAST CANCER: ICD-10-CM

## 2017-08-09 PROCEDURE — 77063 BREAST TOMOSYNTHESIS BI: CPT

## 2017-08-09 NOTE — TELEPHONE ENCOUNTER
Appointment made for [facility] referral urogynecology for microscopic hematuria. September 7 2017 at 8:00 or 8:30 AM, fax unclear[facility]     Nurse Adam Latisha Please call [facility] and get specific time of appointment and call patient to inform. Address below. '    [facility] - Urogynecology  Mehnaz Amaya MD  [address] [place] [address]  [address] [facility]  Phone: [phone]  Fax: [phone]

## 2017-08-10 ENCOUNTER — TELEPHONE (OUTPATIENT)
Dept: FAMILY MEDICINE CLINIC | Age: 67
End: 2017-08-10

## 2017-08-10 DIAGNOSIS — R92.0 MICROCALCIFICATION OF LEFT BREAST ON MAMMOGRAM: Primary | ICD-10-CM

## 2017-08-10 NOTE — TELEPHONE ENCOUNTER
Pt informed of mammgoram results. Diagnostic mammogram ordered. Will place breast consult for urgent eval to discuss results of repeat mammogram for left breast micro-calcifications. All questions answered. Deisy Goodman M.D.   [place] [phone]  [place] [phone]

## 2017-08-11 ENCOUNTER — TELEPHONE (OUTPATIENT)
Dept: FAMILY MEDICINE CLINIC | Age: 67
End: 2017-08-11

## 2017-08-11 NOTE — TELEPHONE ENCOUNTER
Returned call to Pt. Concern about scheduling of mammogram and breast clinic appointment all in same day. Informed to call clinic and ask for appointment to be pushed back by a day to ensure they have repeat mammogram results by the time she is seen by breast surgeon. All questions answered. Jany Francis M.D.   [place] [phone]  [place] [phone]

## 2017-08-15 NOTE — TELEPHONE ENCOUNTER
Called patient to inform lab belén only resulted lipid panel. Patient stated she got her lab draw on 8/2/17 as a lab visit. She stated she would stop by Friday 8/18/2017 to get the labs redrawn. No further question or concern at this time.

## 2017-08-16 ENCOUNTER — TELEPHONE (OUTPATIENT)
Dept: SURGERY | Age: 67
End: 2017-08-16

## 2017-08-16 ENCOUNTER — TELEPHONE (OUTPATIENT)
Dept: FAMILY MEDICINE CLINIC | Age: 67
End: 2017-08-16

## 2017-08-16 NOTE — TELEPHONE ENCOUNTER
Pt was called last week and she was given info for [facility] appt. Pt verbalized understanding of conversation.

## 2017-08-25 ENCOUNTER — OFFICE VISIT (OUTPATIENT)
Dept: FAMILY MEDICINE CLINIC | Age: 67
End: 2017-08-25

## 2017-08-25 VITALS
TEMPERATURE: 96.5 F | DIASTOLIC BLOOD PRESSURE: 79 MMHG | BODY MASS INDEX: 28.53 KG/M2 | OXYGEN SATURATION: 99 % | HEART RATE: 84 BPM | HEIGHT: 63 IN | SYSTOLIC BLOOD PRESSURE: 152 MMHG | WEIGHT: 161 LBS | RESPIRATION RATE: 16 BRPM

## 2017-08-25 DIAGNOSIS — E55.9 VITAMIN D INSUFFICIENCY: ICD-10-CM

## 2017-08-25 DIAGNOSIS — Z00.00 MEDICARE ANNUAL WELLNESS VISIT, SUBSEQUENT: ICD-10-CM

## 2017-08-25 DIAGNOSIS — I10 ESSENTIAL HYPERTENSION: Primary | ICD-10-CM

## 2017-08-25 DIAGNOSIS — R92.0 MICROCALCIFICATION OF LEFT BREAST ON MAMMOGRAM: ICD-10-CM

## 2017-08-25 DIAGNOSIS — E78.5 HYPERLIPIDEMIA, UNSPECIFIED HYPERLIPIDEMIA TYPE: ICD-10-CM

## 2017-08-25 DIAGNOSIS — Z71.89 ACP (ADVANCE CARE PLANNING): ICD-10-CM

## 2017-08-25 DIAGNOSIS — H54.7 DECREASED VISION: ICD-10-CM

## 2017-08-25 DIAGNOSIS — R31.29 MICROSCOPIC HEMATURIA: ICD-10-CM

## 2017-08-25 RX ORDER — LANCETS 30 GAUGE
EACH MISCELLANEOUS
Refills: 0 | COMMUNITY
Start: 2017-07-31 | End: 2018-05-23

## 2017-08-25 NOTE — ACP (ADVANCE CARE PLANNING)
Advance Care Planning (ACP) Provider Note - Comprehensive     Date of ACP Conversation: 08/25/17  Persons included in Conversation:  patient  Length of ACP Conversation in minutes:  16 minutes    Authorized Decision Maker (if patient is incapable of making informed decisions): This person is: Will discuss with family          General ACP for ALL Patients with Decision Making Capacity:   Importance of advance care planning, including choosing a healthcare agent to communicate patient's healthcare decisions if patient lost the ability to make decisions, such as after a sudden illness or accident  Understanding of the healthcare agent role was assessed and information provided  Exploration of values, goals, and preferences if recovery is not expected, even with continued medical treatment in the event of: Imminent death  Severe, permanent brain injury  Opportunity offered to explore how cultural, Episcopalian, spiritual, or personal beliefs would affect decisions for future care     Review of Existing Advance Directive:  N/A    For Serious or Chronic Illness:  Understanding of medical condition      Interventions Provided:  Recommended completion of Advance Directive form after review of ACP materials and conversation with prospective healthcare agent   .

## 2017-08-25 NOTE — MR AVS SNAPSHOT
Visit Information Date & Time Provider Department Dept. Phone Encounter #  
 8/25/2017 10:00 AM Clarence Hair MD [address] [phone] [id number] Follow-up Instructions Return in about 4 weeks (around 9/22/2017), or if symptoms worsen or fail to improve, for HTN. Upcoming Health Maintenance Date Due Pneumococcal 65+ Low/Medium Risk (2 of 2 - PCV13) 9/14/2016 DTaP/Tdap/Td series (2 - Td) 12/31/2016 INFLUENZA AGE 9 TO ADULT 8/1/2017 BREAST CANCER SCRN MAMMOGRAM 8/9/2018 MEDICARE YEARLY EXAM 8/26/2018 GLAUCOMA SCREENING Q2Y 2/1/2019 COLONOSCOPY 9/14/2020 Allergies as of 8/25/2017  Review Complete On: 8/25/2017 By: Clarence Hair MD  
  
 Severity Noted Reaction Type Reactions Alendronate High 02/23/2016    Anaphylaxis Angioedema documented by prior PCP 4/19/2017 Losartan  07/31/2017    Palpitations Noted  [facility] 2014 Simvastatin  08/08/2017    Palpitations Current Immunizations  Never Reviewed Name Date Influenza Vaccine 9/14/2015 12:00 AM  
 Pneumococcal Polysaccharide (PPSV-23) 9/14/2015 12:00 AM  
  
 Not reviewed this visit You Were Diagnosed With   
  
 Codes Comments Essential hypertension    -  Primary ICD-10-CM: I10 
ICD-9-CM: 401.9 Vitamin D insufficiency     ICD-10-CM: E55.9 ICD-9-CM: 268.9 Microcalcification of left breast on mammogram     ICD-10-CM: R92.0 ICD-9-CM: 793.81 Microscopic hematuria     ICD-10-CM: R31.29 ICD-9-CM: 599.72 Hyperlipidemia, unspecified hyperlipidemia type     ICD-10-CM: E78.5 ICD-9-CM: 272.4 Medicare annual wellness visit, subsequent     ICD-10-CM: Z00.00 ICD-9-CM: V70.0 Vitals BP Pulse Temp Resp Height(growth percentile) Weight(growth percentile) 152/79 84 96.5 °F (35.8 °C) (Oral) 16 5' 3\" (1.6 m) 161 lb (73 kg) SpO2 BMI OB Status Smoking Status 99% 28.52 kg/m2 Postmenopausal Former Smoker Vitals History BMI and BSA Data Body Mass Index Body Surface Area 28.52 kg/m 2 1.8 m 2 Preferred Pharmacy Pharmacy Name Phone [facility] [address], [address] [phone] Your Updated Medication List  
  
   
This list is accurate as of: 8/25/17 11:22 AM.  Always use your most recent med list.  
  
  
  
  
 acetaminophen 500 mg tablet Commonly known as:  TYLENOL Take 1 Tab by mouth two (2) times a day. atorvastatin 10 mg tablet Commonly known as:  LIPITOR Take 1 Tab by mouth daily for 90 days. Blood-Glucose Meter monitoring kit Use to test sugar when symptoms occur CALCIUM 600 WITH VITAMIN D3 600 mg(1,500mg) -400 unit Chew Generic drug:  Calcium-Cholecalciferol (D3) Take  by mouth. COD LIVER OIL (BULK)  
by Does Not Apply route. cyclobenzaprine 10 mg tablet Commonly known as:  FLEXERIL Take 0.5-1 Tabs by mouth three (3) times daily as needed for Muscle Spasm(s). glucose 4 gram chewable tablet Take 4 Tabs by mouth as needed. When symptoms occur  
  
 glucose blood VI test strips strip Commonly known as:  ASCENSIA AUTODISC VI, ONE TOUCH ULTRA TEST VI  
Use when symptoms occur * Lancets Misc Use when symptoms occur * ONETOUCH DELICA LANCETS 30 gauge Misc Generic drug:  lancets  
  
 lisinopril 10 mg tablet Commonly known as:  Esvin Clement TK 1 T PO QD  
  
 * Notice: This list has 2 medication(s) that are the same as other medications prescribed for you. Read the directions carefully, and ask your doctor or other care provider to review them with you. Follow-up Instructions Return in about 4 weeks (around 9/22/2017), or if symptoms worsen or fail to improve, for HTN. To-Do List   
 08/25/2017 Lab:  METABOLIC PANEL, COMPREHENSIVE   
  
 08/25/2017 Lab:  VITAMIN D, 25 HYDROXY Patient Instructions FILL OUT ACP FORM AND RETURN NEXT VISIT For Blood Pressure:  
Try to limit salt intake to max 2 grams (2000mg) Continue to exercise Continue blood pressure medicine daily in morning Check BP in local pharmacy 2-3 x a week around mid day, make sure you are sitting with back rested, legs uncrossed, wait about 5-10 minutes before putting arm in cuff Write numbers down and return to clinic in 4 weeks Work on plant based, low fat, low carb diet to help with weight loss and blood pressure RETURN TO GIVE BLOOD FOR VITAMIN D LEVELS AND CHEMISTRY PANEL, THIS IS REALLY IMPORTANT SO PLEASE HAVE DONE BEFORE YOUR NEXT VISIT Get your vision checked and also ask for glaucoma screen, we are placing a referral you should be called to schedule this Schedule of Personalized Health Plan The best way to stay healthy is to live a healthy lifestyle. A healthy lifestyle includes regular exercise, eating a well-balanced diet, keeping a healthy weight and not smoking. Regular physical exams and screening tests are another important way to take care of yourself. Preventive exams provided by health care providers can find health problems early when treatment works best and can keep you from getting certain diseases or illnesses. Preventive services include exams, lab tests, screenings, shots, monitoring and information to help you take care of your own health. All people over 65 should have a pneumonia shot. Pneumonia shots are usually only needed once in a lifetime unless your doctor decides differently. All people over 65 should have a yearly flu shot. People over 65 are at medium to high risk for Hepatitis B. Three shots are needed for complete protection. In addition to your physical exam, some screening tests are recommended: 
 
 
Screening for Cervical Cancer is recommended every two years (annually for certain risk factors, such as previous history of STD or abnormal PAP in past 7 years), with a Pelvic Exam with PAP Here is a list of your current Health Maintenance items with a due date: 
Health Maintenance Topic Date Due  Pneumococcal 65+ Low/Medium Risk (2 of 2 - PCV13) 09/14/2016 DUE FOR PPSV 23 VERSION  
 DTaP/Tdap/Td series (2 - Td) 12/31/2016 DUE NOW  
 INFLUENZA AGE 9 TO ADULT  08/01/2017 DUE NOW  
 BREAST CANCER SCRN MAMMOGRAM  08/09/2018  MEDICARE YEARLY EXAM  08/26/2018  GLAUCOMA SCREENING Q2Y  02/01/2019  COLONOSCOPY  09/14/2020  Hepatitis C Screening  Completed  OSTEOPOROSIS SCREENING (DEXA)  Completed  ZOSTER VACCINE AGE 60>  Completed Introducing [facility]! Jihan Hoffmann introduces [email or web address] patient portal. Now you can access parts of your medical record, email your doctor's office, and request medication refills online. 1. In your internet browser, go to [email or web address] 2. Click on the First Time User? Click Here link in the Sign In box. You will see the New Member Sign Up page. 3. Enter your [email or web address] Access Code exactly as it appears below. You will not need to use this code after youve completed the sign-up process. If you do not sign up before the expiration date, you must request a new code. · [employer] Access Code: [email or web address] Expires: 9/28/2017  9:33 AM 
 
4. Enter the last four digits of your Social Security Number (xxxx) and Date of Birth (mm/dd/yyyy) as indicated and click Submit. You will be taken to the next sign-up page. 5. Create a [employer] ID. This will be your [employer] login ID and cannot be changed, so think of one that is secure and easy to remember. 6. Create a [employer] password. You can change your password at any time. 7. Enter your Password Reset Question and Answer. This can be used at a later time if you forget your password. 8. Enter your e-mail address. You will receive e-mail notification when new information is available in [address] 9. Click Sign Up. You can now view and download portions of your medical record. 10. Click the Download Summary menu link to download a portable copy of your medical information. If you have questions, please visit the Frequently Asked Questions section of the [employer] website. Remember, [employer] is NOT to be used for urgent needs. For medical emergencies, dial 911. Now available from your iPhone and Android! Please provide this summary of care documentation to your next provider. Your primary care clinician is listed as Daniella Velez. If you have any questions after today's visit, please call [phone].

## 2017-08-25 NOTE — PATIENT INSTRUCTIONS
FILL OUT ACP FORM AND RETURN NEXT VISIT    For Blood Pressure:   Try to limit salt intake to max 2 grams (2000mg)  Continue to exercise  Continue blood pressure medicine daily in morning  Check BP in local pharmacy 2-3 x a week around mid day, make sure you are sitting with back rested, legs uncrossed, wait about 5-10 minutes before putting arm in cuff  Write numbers down and return to clinic in 4 weeks  Work on plant based, low fat, low carb diet to help with weight loss and blood pressure    RETURN TO GIVE BLOOD FOR VITAMIN D LEVELS AND CHEMISTRY PANEL, THIS IS REALLY IMPORTANT SO PLEASE HAVE DONE BEFORE YOUR NEXT VISIT    Get your vision checked and also ask for glaucoma screen, we are placing a referral you should be called to schedule this    Schedule of Personalized Health Plan    The best way to stay healthy is to live a healthy lifestyle. A healthy lifestyle includes regular exercise, eating a well-balanced diet, keeping a healthy weight and not smoking. Regular physical exams and screening tests are another important way to take care of yourself. Preventive exams provided by health care providers can find health problems early when treatment works best and can keep you from getting certain diseases or illnesses. Preventive services include exams, lab tests, screenings, shots, monitoring and information to help you take care of your own health. All people over 65 should have a pneumonia shot. Pneumonia shots are usually only needed once in a lifetime unless your doctor decides differently. All people over 65 should have a yearly flu shot. People over 65 are at medium to high risk for Hepatitis B. Three shots are needed for complete protection. In addition to your physical exam, some screening tests are recommended:    Bone mass measurement (dexa scan) is recommended every two years  Diabetes Mellitus screening is recommended every year.     Glaucoma is an eye disease caused by high pressure in the eye. An eye exam is recommended every year. DUE NOW    Cardiovascular screening tests that check your cholesterol and other blood fat (lipid) levels are recommended every five years. Colorectal Cancer screening tests help to find pre-cancerous polyps (growths in the colon) so they can be removed before they turn into cancer. Tests ordered for screening depend on your personal and family history risk factors.     Screening for Breast Cancer is recommended yearly with a mammogram.    Screening for Cervical Cancer is recommended every two years (annually for certain risk factors, such as previous history of STD or abnormal PAP in past 7 years), with a Pelvic Exam with PAP    Here is a list of your current Health Maintenance items with a due date:  Health Maintenance   Topic Date Due    Pneumococcal 65+ Low/Medium Risk (2 of 2 - PCV13) 09/14/2016 DUE FOR PPSV 23 VERSION    DTaP/Tdap/Td series (2 - Td) 12/31/2016 DUE NOW    INFLUENZA AGE 9 TO ADULT  08/01/2017 DUE NOW    BREAST CANCER SCRN MAMMOGRAM  08/09/2018    MEDICARE YEARLY EXAM  08/26/2018    GLAUCOMA SCREENING Q2Y  02/01/2019    COLONOSCOPY  09/14/2020    Hepatitis C Screening  Completed    OSTEOPOROSIS SCREENING (DEXA)  Completed    ZOSTER VACCINE AGE 60>  Completed

## 2017-08-25 NOTE — PROGRESS NOTES
Internal Medicine Follow Up Visit Note    Chief Complaint   Patient presents with    Annual Wellness Visit       HPI:  Iván Jarquin is a 79 y.o.  female with history significant for HTN is here for the above complaint(s). Hypertension: Today BP is not controlled. Medication good compliance. Taking lisinopril 10 mg daily. No side effects from medications. Denies headache, lightheadedness, dizziness, vision changes, chest pain, rapid/irregular heart rate, shortness of breath, cough, abdominal pain, leg swelling, leg pain. Diet and exercises working on. Watching sodium outside of this AM, ate sausage from [employer]. Exercises now. Hematuria: First visit in 1 month, seen in past. [address]  urogynecology. Neck Pain: improved since last visit, improved with chiropactor. ROS: as per HPI  Current Outpatient Prescriptions   Medication Sig    atorvastatin (LIPITOR) 10 mg tablet Take 1 Tab by mouth daily for 90 days.  lisinopril (PRINIVIL, ZESTRIL) 10 mg tablet TK 1 T PO QD    Calcium-Cholecalciferol, D3, (CALCIUM 600 WITH VITAMIN D3) 600 mg(1,500mg) -400 unit chew Take  by mouth.  COD LIVER OIL, BULK, by Does Not Apply route. No current facility-administered medications for this visit. Health Maintenance   Topic Date Due    Pneumococcal 65+ Low/Medium Risk (2 of 2 - PCV13) 09/14/2016    DTaP/Tdap/Td series (2 - Td) 12/31/2016    INFLUENZA AGE 9 TO ADULT  08/01/2017    BREAST CANCER SCRN MAMMOGRAM  08/09/2018    MEDICARE YEARLY EXAM  08/26/2018    GLAUCOMA SCREENING Q2Y  02/01/2019    COLONOSCOPY  09/14/2020    Hepatitis C Screening  Completed    OSTEOPOROSIS SCREENING (DEXA)  Completed    ZOSTER VACCINE AGE 60>  Completed     Immunization History   Administered Date(s) Administered    Influenza Vaccine 09/14/2015    Pneumococcal Polysaccharide (PPSV-23) 09/14/2015       Allergies and Medications: Reviewed and updated in EMR.      Patient Active Problem List Diagnosis Code    Microscopic hematuria R31.29    HLD (hyperlipidemia) E78.5    Vitamin D insufficiency E55.9    HTN (hypertension) I10    Uterine fibroid D25.9    Renal disease N28.9    Hepatic steatosis K76.0    Health care maintenance Z00.00    Colon polyp K63.5    Diverticular disease K57.90    Osteoporosis M81.0    Hearing loss, left H91.92    Environmental allergies Z91.09    Cyst, dermoid, scalp and neck D23.4    Bilateral carpal tunnel syndrome G56.03    Primary localized osteoarthrosis, hand M19.049    Right-sided tinnitus H93.11    DDD (degenerative disc disease), cervical M50.30    Microalbuminuria R80.9    Microcalcification of left breast on mammogram R92.0    Medicare annual wellness visit, subsequent Z00.00     Family History, Social History, Past Medical History, Surgical History: Reviewed and updated in EMR as appropriate. OBJECTIVE:   Visit Vitals    /79    Pulse 84    Temp 96.5 °F (35.8 °C) (Oral)    Resp 16    Ht 5' 3\" (1.6 m)    Wt 161 lb (73 kg)    SpO2 99%  Comment: ra    BMI 28.52 kg/m2        BP Readings from Last 3 Encounters:   08/25/17 152/79   07/28/17 142/90   06/30/17 (!) 162/92     General: Pleasant late middle aged woman in no acute distress  HEENT: Head is atraumatic normo-cephalic. CVS:  Heart regular, rate, and rhythm. Audible S1 and S2. No murmurs, rubs or gallops. PULM: Lungs clear to auscultation bilaterally. No wheezes, rales or rhonchi. EXT: 2+ radial pulses bilaterally. No edema. Neuro: Alert and oriented x3 . Gait wnl. MSE: Mood euthymic, with affect congruent and reactive. No SI/HI    Nursing Notes Reviewed. LABS/RADIOLOGICAL TESTS:    Outside labs reviewed, last obtained 2016  Mammogram reviewed with patient including addendum from 8/15/2017. No need for repeat mammogram, will get screening yearly   All lab results and radiological studies were reviewed and discussed with the patient.     ASSESSMENT/PLAN:      ICD-10-CM ICD-9-CM    1. Essential hypertension V34 526.1 METABOLIC PANEL, COMPREHENSIVE   2. Vitamin D insufficiency E55.9 268.9 VITAMIN D, 25 HYDROXY   3. Microcalcification of left breast on mammogram R92.0 793.81 Yearly screening mammogram, will order diagnostic next year, per read from radiology in comparison to old images no need for further testing at this time. 4. Microscopic hematuria R31.29 599.72 F/u with urogyn as scheduled 9/2017   5. Hyperlipidemia, unspecified hyperlipidemia type E78.5 272.4 Continue current mgmt   6. Medicare annual wellness visit, subsequent Z00.00 V70.0 Discussed below   7. Decreased vision H54.7 369.9 REFERRAL TO OPTOMETRY     Patient verbalized understanding and agreement with the plan. Patient was given an after-visit summary. Follow-up Disposition:  Return in about 4 weeks (around 9/22/2017), or if symptoms worsen or fail to improve, for HTN. or sooner if worsening symptoms. More than 50% of this 60 min visit was spent counseling the patient face to face about HCM, HTN, vision, breast screening      Gauri Neville M.D. [place] [phone]  Fax - [address]    Cristine Combs is a 79 y.o. female and presents for annual Medicare Wellness Visit. Problem List: Reviewed with patient and discussed risk factors.     Patient Active Problem List   Diagnosis Code    Microscopic hematuria R31.29    HLD (hyperlipidemia) E78.5    Vitamin D insufficiency E55.9    HTN (hypertension) I10    Uterine fibroid D25.9    Renal disease N28.9    Hepatic steatosis K76.0    Health care maintenance Z00.00    Colon polyp K63.5    Diverticular disease K57.90    Osteoporosis M81.0    Hearing loss, left H91.92    Environmental allergies Z91.09    Cyst, dermoid, scalp and neck D23.4    Bilateral carpal tunnel syndrome G56.03    Primary localized osteoarthrosis, hand M19.049    Right-sided tinnitus H93.11    DDD (degenerative disc disease), cervical M50.30    Microalbuminuria R80.9    Microcalcification of left breast on mammogram R92.0    Medicare annual wellness visit, subsequent Z00.00       Current medical providers:  Patient Care Team:  Leon Floyd MD as PCP - General (Internal Medicine)  Melissa Thomas MD as Physician (Obstetrics & Gynecology)    PSH: Reviewed with patient  Past Surgical History:   Procedure Laterality Date    HX PREMALIG/BENIGN SKIN LESION EXCISION      gland excision        SH: Reviewed with patient  Social History   Substance Use Topics    Smoking status: Former Smoker     Types: Cigarettes    Smokeless tobacco: Never Used      Comment: RARE SMOKING IN PAST    Alcohol use No      Comment: RARE IN PAST       FH: Reviewed with patient  Family History   Problem Relation Age of Onset    Thyroid Disease Mother     Hypertension Father     Thyroid Disease Other      GRAVES       Medications/Allergies: Reviewed with patient  Current Outpatient Prescriptions on File Prior to Visit   Medication Sig Dispense Refill    atorvastatin (LIPITOR) 10 mg tablet Take 1 Tab by mouth daily for 90 days. 30 Tab 2    lisinopril (PRINIVIL, ZESTRIL) 10 mg tablet TK 1 T PO QD  1    Calcium-Cholecalciferol, D3, (CALCIUM 600 WITH VITAMIN D3) 600 mg(1,500mg) -400 unit chew Take  by mouth.  COD LIVER OIL, BULK, by Does Not Apply route. No current facility-administered medications on file prior to visit. Allergies   Allergen Reactions    Alendronate Anaphylaxis     Angioedema documented by prior PCP 4/19/2017    Losartan Palpitations     Noted  [facility] 2014    Simvastatin Palpitations     Objective:  Visit Vitals    /79    Pulse 84    Temp 96.5 °F (35.8 °C) (Oral)    Resp 16    Ht 5' 3\" (1.6 m)    Wt 161 lb (73 kg)    SpO2 99%  Comment: ra    BMI 28.52 kg/m2    Body mass index is 28.52 kg/(m^2).     Physical Exam: no acute distress, sitting comfortably. Assessment of cognitive impairment: Alert and oriented x person, place, time and situation. Depression Screen:   PHQ over the last two weeks 7/28/2017   Little interest or pleasure in doing things Not at all   Feeling down, depressed or hopeless Not at all   Total Score PHQ 2 0       Fall Risk Assessment:    Fall Risk Assessment, last 12 mths 7/28/2017   Able to walk? Yes   Fall in past 12 months? No       Functional Ability:   Does the patient exhibit a steady gait? yes   How long did it take the patient to get up and walk from a sitting position? <3 seconds   Is the patient self reliant?  (ie can do own laundry, meals, household chores)  yes     Does the patient handle his/her own medications? yes     Does the patient handle his/her own money? yes     Is the patients home safe (ie good lighting, handrails on stairs and bath, etc.)? yes     Did you notice or did patient express any hearing difficulties? Hearing aid on left side, whisper test negative      Did you notice or did patient express any vision difficulties? Needs new glases     Were distance and reading eye charts used?   no       Advance Care Planning:   Patient was offered the opportunity to discuss advance care planning: yes   Does patient have an Advance Directive:  no   If no, did you provide information on Caring Connections?  no       Plan:      Orders Placed This Encounter    VITAMIN D, 25 HYDROXY    METABOLIC PANEL, COMPREHENSIVE    REFERRAL TO OPTOMETRY    [facility] LANCETS 30 gauge misc       Health Maintenance   Topic Date Due    Pneumococcal 65+ Low/Medium Risk (2 of 2 - PCV13) 09/14/2016    DTaP/Tdap/Td series (2 - Td) 12/31/2016    INFLUENZA AGE 9 TO ADULT  08/01/2017    BREAST CANCER SCRN MAMMOGRAM  08/09/2018    MEDICARE YEARLY EXAM  08/26/2018    GLAUCOMA SCREENING Q2Y  02/01/2019    COLONOSCOPY  09/14/2020    Hepatitis C Screening  Completed    OSTEOPOROSIS SCREENING (DEXA) Completed    ZOSTER VACCINE AGE 60>  Completed       *Patient verbalized understanding and agreement with the plan. A copy of the After Visit Summary with personalized health plan was given to the patient today.

## 2017-08-28 ENCOUNTER — TELEPHONE (OUTPATIENT)
Dept: SURGERY | Age: 67
End: 2017-08-28

## 2017-08-28 NOTE — TELEPHONE ENCOUNTER
Spoke with Ms. Sola Carl to reschedule her appointment with Dr. Ck Mosqueda due to her no show for appointment on August 17, 2017 for an evaluation of microcalcification of the left breast on mammogram per Dr. Danya Escamilla. Ms. Santiagoaspenhever Carl states she received a letter stating she don't need to follow up for a year so the appointment with Dr. Ck Mosqueda is not necessary, therefore patient declined to schedule appointment.

## 2017-09-11 PROBLEM — H40.9 GLAUCOMA OF BOTH EYES: Status: ACTIVE | Noted: 2017-09-11

## 2017-09-11 PROBLEM — H25.9 AGE-RELATED CATARACT OF BOTH EYES: Status: ACTIVE | Noted: 2017-09-11

## 2017-09-18 ENCOUNTER — TELEPHONE (OUTPATIENT)
Dept: FAMILY MEDICINE CLINIC | Age: 67
End: 2017-09-18

## 2017-09-18 NOTE — TELEPHONE ENCOUNTER
Pt called and stated she had an appointment to see a neurology specialist and did not know the name or phone number to the office to cancel her 9:30 a.m. Appointment. I did not see the referral in question. Please return pt call.

## 2017-09-19 NOTE — TELEPHONE ENCOUNTER
Please call patient and inform her that Dr. Rafael Ivey did not make neurology referral since she started as a patient. Urology referral   [facility] - Urogynecology  Eilleen Dandy, MD  [address] [place] [address]   [address] [facility]  Phone: [phone]  Fax: [phone]    Optometry referral  [facility]  Dr. Ayse Sidhu  [address]  [employer] [address] [place] [address]  [phone] (fax)     Martina Wilson M.D.   [address], [address] [place] [address], [address] [place] [address] [address] [place] [address] [phone]

## 2017-09-29 ENCOUNTER — TELEPHONE (OUTPATIENT)
Dept: FAMILY MEDICINE CLINIC | Age: 67
End: 2017-09-29

## 2017-11-06 ENCOUNTER — TELEPHONE (OUTPATIENT)
Dept: FAMILY MEDICINE CLINIC | Age: 67
End: 2017-11-06

## 2017-11-06 ENCOUNTER — OFFICE VISIT (OUTPATIENT)
Dept: FAMILY MEDICINE CLINIC | Age: 67
End: 2017-11-06

## 2017-11-06 VITALS
TEMPERATURE: 97.1 F | OXYGEN SATURATION: 99 % | HEIGHT: 63 IN | DIASTOLIC BLOOD PRESSURE: 79 MMHG | WEIGHT: 161 LBS | BODY MASS INDEX: 28.53 KG/M2 | RESPIRATION RATE: 16 BRPM | HEART RATE: 82 BPM | SYSTOLIC BLOOD PRESSURE: 167 MMHG

## 2017-11-06 DIAGNOSIS — Z01.89 ROUTINE LAB DRAW: ICD-10-CM

## 2017-11-06 DIAGNOSIS — Z00.00 HEALTH CARE MAINTENANCE: ICD-10-CM

## 2017-11-06 DIAGNOSIS — B00.9 DISEASE OF GINGIVA DUE TO RECURRENT ORAL HERPES SIMPLEX VIRUS (HSV) INFECTION: ICD-10-CM

## 2017-11-06 DIAGNOSIS — I10 ESSENTIAL HYPERTENSION: ICD-10-CM

## 2017-11-06 DIAGNOSIS — R73.09 ELEVATED GLUCOSE LEVEL: ICD-10-CM

## 2017-11-06 DIAGNOSIS — K06.9 DISEASE OF GINGIVA DUE TO RECURRENT ORAL HERPES SIMPLEX VIRUS (HSV) INFECTION: ICD-10-CM

## 2017-11-06 DIAGNOSIS — E55.9 VITAMIN D INSUFFICIENCY: ICD-10-CM

## 2017-11-06 DIAGNOSIS — E55.9 VITAMIN D DEFICIENCY: ICD-10-CM

## 2017-11-06 DIAGNOSIS — J06.9 VIRAL URI WITH COUGH: Primary | ICD-10-CM

## 2017-11-06 DIAGNOSIS — I10 ESSENTIAL HYPERTENSION: Primary | ICD-10-CM

## 2017-11-06 RX ORDER — GUAIFENESIN 600 MG/1
1200 TABLET, EXTENDED RELEASE ORAL 2 TIMES DAILY
Qty: 28 TAB | Refills: 0 | Status: SHIPPED | OUTPATIENT
Start: 2017-11-06 | End: 2017-11-13

## 2017-11-06 RX ORDER — BENZONATATE 100 MG/1
100 CAPSULE ORAL
Qty: 30 CAP | Refills: 0 | Status: SHIPPED | OUTPATIENT
Start: 2017-11-06 | End: 2017-11-16

## 2017-11-06 RX ORDER — VALACYCLOVIR HYDROCHLORIDE 1 G/1
2000 TABLET, FILM COATED ORAL 2 TIMES DAILY
Qty: 4 TAB | Refills: 1 | Status: SHIPPED | OUTPATIENT
Start: 2017-11-06 | End: 2017-11-07

## 2017-11-06 NOTE — PROGRESS NOTES
[facility] INTERNAL MEDICINE NOTE    Chief Complaint   Patient presents with    Cold Symptoms       HPI: Bijal Nguyễn is a 79 y.o. female  with PMHx significant for overweight who presents with the above CC(s). Cold symptoms:  Onset of symptoms 11 days ago noticed sore throat, cough productive of white sputum, runny nose, sinus congestion, fever blisters for last few days. Feels better today. Coughing and mucus still bothersome. Some shortness of breath with exertion today but no wheezing noticed today. No chest pain, palpitations, lightheadedness or dizziness. Some stomach queasiness, no nausea or vomiting, diarrhea or constipations. Some fevers or chills, last occurred last night. Tried tara-oil, cod-liver oil, coricidin and mucinex helping with thinnig mucus. Has increased water intake since last visit. Cold Sores: Started Naveed Del yesterday decreased fever blisters. Typically occurs at end of viral illness. Hypertension: Today BP is uncontrolled. Medication fair compliance. Taking lisinopril 10mg, last dose this AM. No side effects from medications. Denies headache, lightheadedness, dizziness, vision changes, chest pain, rapid/irregular heart rate, shortness of breath, cough, abdominal pain, leg swelling, leg pain. No outside BP checks. BP at [facility], 140s/80s, after exercise 130s/70s. HCM: Due CMP, vitamin D, A1c. Vaccines due flu shot, Prevnar 13, TDAP.      ROS: as per HPI    Past Medical Hx, Family Hx, Social Hx, Surgical Hx, Medications, Allergies: All reviewed and updated in EMR as appropriate. Current Outpatient Prescriptions   Medication Sig    valACYclovir (VALTREX) 1 gram tablet Take 2 Tabs by mouth two (2) times a day for 1 day.  guaiFENesin ER (MUCINEX) 600 mg ER tablet Take 2 Tabs by mouth two (2) times a day for 7 days.  benzonatate (TESSALON) 100 mg capsule Take 1 Cap by mouth three (3) times daily as needed for Cough for up to 10 days.     [employer] LANCETS 30 gauge misc     atorvastatin (LIPITOR) 10 mg tablet Take 1 Tab by mouth daily for 90 days.  Blood-Glucose Meter monitoring kit Use to test sugar when symptoms occur    Lancets misc Use when symptoms occur    glucose blood VI test strips (ASCENSIA AUTODISC VI, ONE TOUCH ULTRA TEST VI) strip Use when symptoms occur    glucose 4 gram chewable tablet Take 4 Tabs by mouth as needed. When symptoms occur    lisinopril (PRINIVIL, ZESTRIL) 10 mg tablet TK 1 T PO QD    COD LIVER OIL, BULK, by Does Not Apply route.  acetaminophen (TYLENOL) 500 mg tablet Take 1 Tab by mouth two (2) times a day.  Calcium-Cholecalciferol, D3, (CALCIUM 600 WITH VITAMIN D3) 600 mg(1,500mg) -400 unit chew Take  by mouth.  cyclobenzaprine (FLEXERIL) 10 mg tablet Take 0.5-1 Tabs by mouth three (3) times daily as needed for Muscle Spasm(s). No current facility-administered medications for this visit. Health Maintenance   Topic Date Due    Pneumococcal 65+ Low/Medium Risk (2 of 2 - PCV13) 09/14/2016    DTaP/Tdap/Td series (2 - Td) 12/31/2016    INFLUENZA AGE 9 TO ADULT  08/01/2017    BREAST CANCER SCRN MAMMOGRAM  08/09/2018    MEDICARE YEARLY EXAM  08/26/2018    GLAUCOMA SCREENING Q2Y  08/31/2019    COLONOSCOPY  09/14/2020    Hepatitis C Screening  Completed    OSTEOPOROSIS SCREENING (DEXA)  Completed    ZOSTER VACCINE AGE 60>  Completed       OBJECTIVE:  Vitals:    11/06/17 1005   BP: 167/79   Pulse: 82   Resp: 16   Temp: 97.1 °F (36.2 °C)   TempSrc: Oral   SpO2: 99%   Weight: 161 lb (73 kg)   Height: 5' 3\" (1.6 m)       BP Readings from Last 3 Encounters:   11/06/17 167/79   08/25/17 152/79   07/28/17 142/90     Wt Readings from Last 3 Encounters:   11/06/17 161 lb (73 kg)   08/25/17 161 lb (73 kg)   07/28/17 160 lb 12.8 oz (72.9 kg)       General: Pleasant adult woman in no acute distress  Head: Head is atraumatic normo-cephalic.    Eyes: Pupils equally round and reactive to light, extraocular muscle intact, conjunctiva clear, non-icterus. Ears: Bilaterally normal TM, no erythema or exudate, normal light reflex. Nose: Nares bilaterally mucosal mild erythema, clear discharge. Nasal turbinates hypertrophy. Mouth: Oropharynx with cobblestoning. No tonsillar exudates visible. Neck: Supple, no LAD, no palpable thyromegaly. Skin: visible ulcerations with partial crusting on upper lip and distal nares. CVS:  Heart regular, rate, and rhythm. Audible S1 and S2. No murmurs, rubs or gallops. PULM: Lungs clear to auscultation bilaterally. No wheezes, rales or rhonchi. Neuro: Alert and oriented x3. Nursing Notes Reviewed    ASSESSMENT AND PLAN:    ICD-10-CM ICD-9-CM    1. Viral URI with cough J06.9 465.9 guaiFENesin ER (MUCINEX) 600 mg ER tablet    B97.89  benzonatate (TESSALON) 100 mg capsule  Given return precautions   2. Elevated glucose level R73.09 790.29 HEMOGLOBIN A1C WITH EAG      METABOLIC PANEL, COMPREHENSIVE   3. Vitamin D insufficiency E55.9 268.9 VITAMIN D, 25 HYDROXY   4. Essential hypertension S08 695.8 METABOLIC PANEL, COMPREHENSIVE  BP log  Continue lisinopril 10 mg daily  RTC in 4 weeks   5. 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 Health care maintenance Z00.00 V70.0 Awaiting records from [facility]  Called office: per Dr. Jin [place] office no pneumococcal vaccine. zosters and flu shot completed at this office  Called office: [facility] 9/2015 PPSV23 pneumococcal vaccine administered, no others.    Pt encouraged to get Prevnar 13 at pharmacy       Orders Placed This Encounter    HEMOGLOBIN A1C WITH EAG    VITAMIN D, 25 HYDROXY    METABOLIC PANEL, COMPREHENSIVE    valACYclovir (VALTREX) 1 gram tablet    guaiFENesin ER (MUCINEX) 600 mg ER tablet    benzonatate (TESSALON) 100 mg capsule       Future Appointments  Date Time Provider Jessica Solorzano 12/12/2017 3:00 PM Mila Campbell MD [facility]       After visit summary provided to patient    Assessment and plan above discussed with patient, patient voiced understanding and agreement with plan. More than 50% of this 25 min visit was spent counseling the patient face to face about above health conditions    Mildred Peralta M.D.   [place] [phone]

## 2017-11-06 NOTE — MR AVS SNAPSHOT
Visit Information Date & Time Provider Department Dept. Phone Encounter #  
 11/6/2017 10:00 AM Hortencia Feng MD [address] [phone] [id number] Follow-up Instructions Return in about 4 weeks (around 12/4/2017), or if symptoms worsen or fail to improve, for HTN. Your Appointments 12/12/2017  3:00 PM  
New Patient with Francesca Greenfield MD  
[facility] [address]) Appt Note: NP; pt aware of appt [address] [facility] [address] [facility] [address] [place] [address]  
[phone]  
  
   
 [place] [id number] Upcoming Health Maintenance Date Due Pneumococcal 65+ Low/Medium Risk (2 of 2 - PCV13) 9/14/2016 DTaP/Tdap/Td series (2 - Td) 12/31/2016 INFLUENZA AGE 9 TO ADULT 8/1/2017 BREAST CANCER SCRN MAMMOGRAM 8/9/2018 MEDICARE YEARLY EXAM 8/26/2018 GLAUCOMA SCREENING Q2Y 8/31/2019 COLONOSCOPY 9/14/2020 Allergies as of 11/6/2017  Review Complete On: 11/6/2017 By: Jesus Velez LPN Severity Noted Reaction Type Reactions Alendronate High 02/23/2016    Anaphylaxis Angioedema documented by prior PCP 4/19/2017 Losartan  07/31/2017    Palpitations Noted  Royer Harman [facility] 2014 Simvastatin  08/08/2017    Palpitations Current Immunizations  Never Reviewed Name Date Influenza Vaccine 9/14/2015 12:00 AM  
 Pneumococcal Polysaccharide (PPSV-23) 9/14/2015 12:00 AM  
  
 Not reviewed this visit You Were Diagnosed With   
  
 Codes Comments Viral URI with cough    -  Primary ICD-10-CM: J06.9, B97.89 ICD-9-CM: 465.9 Elevated glucose level     ICD-10-CM: R73.09 
ICD-9-CM: 790.29 Vitamin D insufficiency     ICD-10-CM: E55.9 ICD-9-CM: 268.9 Essential hypertension     ICD-10-CM: I10 
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   
 Health care maintenance     ICD-10-CM: Z00.00 ICD-9-CM: V70.0 Vitals BP Pulse Temp Resp Height(growth percentile) Weight(growth percentile) 167/79 82 97.1 °F (36.2 °C) (Oral) 16 5' 3\" (1.6 m) 161 lb (73 kg) SpO2 BMI OB Status Smoking Status 99% 28.52 kg/m2 Postmenopausal Former Smoker Vitals History BMI and BSA Data Body Mass Index Body Surface Area 28.52 kg/m 2 1.8 m 2 Preferred Pharmacy Pharmacy Name Phone Mirian Canales [address], [address] [facility] [address] [facility] [phone] Your Updated Medication List  
  
   
This list is accurate as of: 11/6/17 10:38 AM.  Always use your most recent med list.  
  
  
  
  
 acetaminophen 500 mg tablet Commonly known as:  TYLENOL Take 1 Tab by mouth two (2) times a day. atorvastatin 10 mg tablet Commonly known as:  LIPITOR Take 1 Tab by mouth daily for 90 days. benzonatate 100 mg capsule Commonly known as:  TESSALON Take 1 Cap by mouth three (3) times daily as needed for Cough for up to 10 days. Blood-Glucose Meter monitoring kit Use to test sugar when symptoms occur CALCIUM 600 WITH VITAMIN D3 600 mg(1,500mg) -400 unit Chew Generic drug:  Calcium-Cholecalciferol (D3) Take  by mouth. COD LIVER OIL (BULK)  
by Does Not Apply route. cyclobenzaprine 10 mg tablet Commonly known as:  FLEXERIL Take 0.5-1 Tabs by mouth three (3) times daily as needed for Muscle Spasm(s). glucose 4 gram chewable tablet Take 4 Tabs by mouth as needed. When symptoms occur  
  
 glucose blood VI test strips strip Commonly known as:  ASCENSIA AUTODISC VI, ONE TOUCH ULTRA TEST VI  
Use when symptoms occur  
  
 guaiFENesin  mg ER tablet Commonly known as:  Amarjit & Amarjit Take 2 Tabs by mouth two (2) times a day for 7 days. * Lancets Misc Use when symptoms occur * ONETOUCH DELICA LANCETS 30 gauge Misc Generic drug:  lancets  
  
 lisinopril 10 mg tablet Commonly known as:  Alley Kearneylatrice TK 1 T PO QD  
  
 valACYclovir 1 gram tablet Commonly known as:  VALTREX Take 2 Tabs by mouth two (2) times a day for 1 day. * Notice: This list has 2 medication(s) that are the same as other medications prescribed for you. Read the directions carefully, and ask your doctor or other care provider to review them with you. Prescriptions Sent to Pharmacy Refills  
 valACYclovir (VALTREX) 1 gram tablet 1 Sig: Take 2 Tabs by mouth two (2) times a day for 1 day. Class: Normal  
 Pharmacy: [facility] [address] #: [phone] Route: Oral  
 guaiFENesin ER (MUCINEX) 600 mg ER tablet 0 Sig: Take 2 Tabs by mouth two (2) times a day for 7 days. Class: Normal  
 Pharmacy: [facility] [address] Ph #: [phone] Route: Oral  
 benzonatate (TESSALON) 100 mg capsule 0 Sig: Take 1 Cap by mouth three (3) times daily as needed for Cough for up to 10 days. Class: Normal  
 Pharmacy: [facility] [address] #: [phone] Route: Oral  
  
Follow-up Instructions Return in about 4 weeks (around 12/4/2017), or if symptoms worsen or fail to improve, for HTN. To-Do List   
 11/06/2017 Lab:  HEMOGLOBIN A1C WITH EAG   
  
 11/06/2017 Lab:  METABOLIC PANEL, COMPREHENSIVE   
  
 11/06/2017 Lab:  VITAMIN D, 25 HYDROXY Patient Instructions Fever Blister: 
Take valtrex 2000 mg twice a day for 1 day Continue Chartbeat For Cold: Take mucinex as prescribed Take tessalon pearls three times a day as needed for cough Get over the counter saline nasal spray for nose as needed For high blood pressure: Take lisinopril as prescribed Continue to work on low salt diet Check BP 3x per week around mid day Bring log to next clinic visit We will let you know the results of your blood and urine test when they come in. We will call if abnormal and send a letter if normal. 
 
 
  
Viral Respiratory Infection: Care Instructions Your Care Instructions Viruses are very small organisms. They grow in number after they enter your body. There are many types that cause different illnesses, such as colds and the mumps. The symptoms of a viral respiratory infection often start quickly. They include a fever, sore throat, and runny nose. You may also just not feel well. Or you may not want to eat much. Most viral respiratory infections are not serious. They usually get better with time and self-care. Antibiotics are not used to treat a viral infection. That's because antibiotics will not help cure a viral illness. In some cases, antiviral medicine can help your body fight a serious viral infection. Follow-up care is a key part of your treatment and safety. Be sure to make and go to all appointments, and call your doctor if you are having problems. It's also a good idea to know your test results and keep a list of the medicines you take. How can you care for yourself at home? · Rest as much as possible until you feel better. · Be safe with medicines. Take your medicine exactly as prescribed. Call your doctor if you think you are having a problem with your medicine. You will get more details on the specific medicine your doctor prescribes. · Take an over-the-counter pain medicine, such as acetaminophen (Tylenol), ibuprofen (Advil, Motrin), or naproxen (Aleve), as needed for pain and fever. Read and follow all instructions on the label. Do not give aspirin to anyone younger than 20. It has been linked to Reye syndrome, a serious illness.  
· Drink plenty of fluids, enough so that your urine is light yellow or clear like water. Hot fluids, such as tea or soup, may help relieve congestion in your nose and throat. If you have kidney, heart, or liver disease and have to limit fluids, talk with your doctor before you increase the amount of fluids you drink. · Try to clear mucus from your lungs by breathing deeply and coughing. · Gargle with warm salt water once an hour. This can help reduce swelling and throat pain. Use 1 teaspoon of salt mixed in 1 cup of warm water. · Do not smoke or allow others to smoke around you. If you need help quitting, talk to your doctor about stop-smoking programs and medicines. These can increase your chances of quitting for good. To avoid spreading the virus · Cough or sneeze into a tissue. Then throw the tissue away. · If you don't have a tissue, use your hand to cover your cough or sneeze. Then clean your hand. You can also cough into your sleeve. · Wash your hands often. Use soap and warm water. Wash for 15 to 20 seconds each time. · If you don't have soap and water near you, you can clean your hands with alcohol wipes or gel. When should you call for help? Call your doctor now or seek immediate medical care if: 
? · You have a new or higher fever. ? · Your fever lasts more than 48 hours. ? · You have trouble breathing. ? · You have a fever with a stiff neck or a severe headache. ? · You are sensitive to light. ? · You feel very sleepy or confused. ? Watch closely for changes in your health, and be sure to contact your doctor if: 
? · You do not get better as expected. Where can you learn more? Go to http://james-anirudh.info/. Enter D271 in the search box to learn more about \"Viral Respiratory Infection: Care Instructions. \" Current as of: May 12, 2017 Content Version: 11.4 © 9782-8723 [employer].  Care instructions adapted under license by [employer] (which disclaims liability or warranty for this information). If you have questions about a medical condition or this instruction, always ask your healthcare professional. [address] any warranty or liability for your use of this information. Introducing [facility]! [facility] introduces Luxera patient portal. Now you can access parts of your medical record, email your doctor's office, and request medication refills online. 1. In your internet browser, go to [email or web address] 2. Click on the First Time User? Click Here link in the Sign In box. You will see the New Member Sign Up page. 3. Enter your Luxera Access Code exactly as it appears below. You will not need to use this code after youve completed the sign-up process. If you do not sign up before the expiration date, you must request a new code. · Luxera Access Code: [email or web address] Expires: 2/4/2018 10:37 AM 
 
4. Enter the last four digits of your Social Security Number (xxxx) and Date of Birth (mm/dd/yyyy) as indicated and click Submit. You will be taken to the next sign-up page. 5. Create a Luxera ID. This will be your Luxera login ID and cannot be changed, so think of one that is secure and easy to remember. 6. Create a Luxera password. You can change your password at any time. 7. Enter your Password Reset Question and Answer. This can be used at a later time if you forget your password. 8. Enter your e-mail address. You will receive e-mail notification when new information is available in [address] 9. Click Sign Up. You can now view and download portions of your medical record. 10. Click the Download Summary menu link to download a portable copy of your medical information. If you have questions, please visit the Frequently Asked Questions section of the Luxera website. Remember, Luxera is NOT to be used for urgent needs. For medical emergencies, dial 911. Now available from your iPhone and Android! Please provide this summary of care documentation to your next provider. Your primary care clinician is listed as Shantell Chiu. If you have any questions after today's visit, please call [phone].

## 2017-11-06 NOTE — TELEPHONE ENCOUNTER
Call [facility] Medical Records to request patient's immunization records fax to our office. Informed YANCI was faxed on 7/5/2017.

## 2017-11-06 NOTE — PROGRESS NOTES
Chief Complaint   Patient presents with    Cold Symptoms     1 week thick white mucus. Patient states that she would like to wait on getting the flu vaccination. 1. Have you been to the ER, urgent care clinic since your last visit? Hospitalized since your last visit? No    2. Have you seen or consulted any other health care providers outside of the [address] [place] [address] since your last visit? Include any pap smears or colon screening.  No

## 2017-11-06 NOTE — PATIENT INSTRUCTIONS
Fever Blister:  Take valtrex 2000 mg twice a day for 1 day  Continue abrevia    For Cold: Take mucinex as prescribed  Take tessalon pearls three times a day as needed for cough  Get over the counter saline nasal spray for nose as needed    For high blood pressure: Take lisinopril as prescribed  Continue to work on low salt diet  Check BP 3x per week around mid day  Bring log to next clinic visit    We will let you know the results of your blood and urine test when they come in. We will call if abnormal and send a letter if normal.         Viral Respiratory Infection: Care Instructions  Your Care Instructions    Viruses are very small organisms. They grow in number after they enter your body. There are many types that cause different illnesses, such as colds and the mumps. The symptoms of a viral respiratory infection often start quickly. They include a fever, sore throat, and runny nose. You may also just not feel well. Or you may not want to eat much. Most viral respiratory infections are not serious. They usually get better with time and self-care. Antibiotics are not used to treat a viral infection. That's because antibiotics will not help cure a viral illness. In some cases, antiviral medicine can help your body fight a serious viral infection. Follow-up care is a key part of your treatment and safety. Be sure to make and go to all appointments, and call your doctor if you are having problems. It's also a good idea to know your test results and keep a list of the medicines you take. How can you care for yourself at home? · Rest as much as possible until you feel better. · Be safe with medicines. Take your medicine exactly as prescribed. Call your doctor if you think you are having a problem with your medicine. You will get more details on the specific medicine your doctor prescribes.   · Take an over-the-counter pain medicine, such as acetaminophen (Tylenol), ibuprofen (Advil, Motrin), or naproxen (Aleve), as needed for pain and fever. Read and follow all instructions on the label. Do not give aspirin to anyone younger than 20. It has been linked to Reye syndrome, a serious illness. · Drink plenty of fluids, enough so that your urine is light yellow or clear like water. Hot fluids, such as tea or soup, may help relieve congestion in your nose and throat. If you have kidney, heart, or liver disease and have to limit fluids, talk with your doctor before you increase the amount of fluids you drink. · Try to clear mucus from your lungs by breathing deeply and coughing. · Gargle with warm salt water once an hour. This can help reduce swelling and throat pain. Use 1 teaspoon of salt mixed in 1 cup of warm water. · Do not smoke or allow others to smoke around you. If you need help quitting, talk to your doctor about stop-smoking programs and medicines. These can increase your chances of quitting for good. To avoid spreading the virus  · Cough or sneeze into a tissue. Then throw the tissue away. · If you don't have a tissue, use your hand to cover your cough or sneeze. Then clean your hand. You can also cough into your sleeve. · Wash your hands often. Use soap and warm water. Wash for 15 to 20 seconds each time. · If you don't have soap and water near you, you can clean your hands with alcohol wipes or gel. When should you call for help? Call your doctor now or seek immediate medical care if:  ? · You have a new or higher fever. ? · Your fever lasts more than 48 hours. ? · You have trouble breathing. ? · You have a fever with a stiff neck or a severe headache. ? · You are sensitive to light. ? · You feel very sleepy or confused. ? Watch closely for changes in your health, and be sure to contact your doctor if:  ? · You do not get better as expected. Where can you learn more? Go to http://james-anirudh.info/.   Enter Z275 in the search box to learn more about \"Viral Respiratory Infection: Care Instructions. \"  Current as of: May 12, 2017  Content Version: 11.4  © 9171-2563 [employer] ([employer]). Care instructions adapted under license by [employer] (which disclaims liability or warranty for this information). If you have questions about a medical condition or this instruction, always ask your healthcare professional. [place] [address] any warranty or liability for your use of this information.

## 2017-11-07 LAB
25(OH)D3+25(OH)D2 SERPL-MCNC: 20.2 NG/ML (ref 30–100)
ALBUMIN SERPL-MCNC: 3.6 G/DL (ref 3.6–4.8)
ALBUMIN/GLOB SERPL: 1.1 {RATIO} (ref 1.2–2.2)
ALP SERPL-CCNC: 70 IU/L (ref 39–117)
ALT SERPL-CCNC: 14 IU/L (ref 0–32)
AST SERPL-CCNC: 18 IU/L (ref 0–40)
BILIRUB SERPL-MCNC: 0.2 MG/DL (ref 0–1.2)
BUN SERPL-MCNC: 9 MG/DL (ref 8–27)
BUN/CREAT SERPL: 14 (ref 12–28)
CALCIUM SERPL-MCNC: 9.4 MG/DL (ref 8.7–10.3)
CHLORIDE SERPL-SCNC: 103 MMOL/L (ref 96–106)
CO2 SERPL-SCNC: 27 MMOL/L (ref 18–29)
CREAT SERPL-MCNC: 0.64 MG/DL (ref 0.57–1)
EST. AVERAGE GLUCOSE BLD GHB EST-MCNC: 126 MG/DL
GFR SERPLBLD CREATININE-BSD FMLA CKD-EPI: 107 ML/MIN/1.73
GFR SERPLBLD CREATININE-BSD FMLA CKD-EPI: 93 ML/MIN/1.73
GLOBULIN SER CALC-MCNC: 3.4 G/DL (ref 1.5–4.5)
GLUCOSE SERPL-MCNC: 140 MG/DL (ref 65–99)
HBA1C MFR BLD: 6 % (ref 4.8–5.6)
POTASSIUM SERPL-SCNC: 3.8 MMOL/L (ref 3.5–5.2)
PROT SERPL-MCNC: 7 G/DL (ref 6–8.5)
SODIUM SERPL-SCNC: 144 MMOL/L (ref 134–144)

## 2017-11-08 ENCOUNTER — TELEPHONE (OUTPATIENT)
Dept: FAMILY MEDICINE CLINIC | Age: 67
End: 2017-11-08

## 2017-11-08 DIAGNOSIS — R73.03 PRE-DIABETES: ICD-10-CM

## 2017-11-08 DIAGNOSIS — B00.9 HSV INFECTION: Primary | ICD-10-CM

## 2017-11-08 RX ORDER — ACYCLOVIR 400 MG/1
400 TABLET ORAL 3 TIMES DAILY
Qty: 15 TAB | Refills: 2 | Status: SHIPPED | OUTPATIENT
Start: 2017-11-08 | End: 2017-11-13

## 2017-11-08 NOTE — TELEPHONE ENCOUNTER
Pt called back and said the best number to be reached at until the end of the business day is [phone].

## 2017-11-08 NOTE — TELEPHONE ENCOUNTER
Rx changed to acyclovir. Given valtrex not covered. Per pharmacist acyclovir is covered and will call patient when ready for . 11/8/2017 Called patient to discuss. No answer. Left VM for patient to return call and leave best number and time to be reached and will attempt call at that time. Awaiting call back. 11/9/2017 Called and discussed below with patient. Voiced understanding. Lab results:  A1 6.0 pre-diabetes  Low carb diet and exercise  Return to clinic as scheduled    Low Vitamin D:  5000 units vitamin d daily  Recheck in 3 months    Dante Lewis M.D.   [address], [address] [place] [address], [address] [place] [address] [phone]  [facility] [phone]

## 2017-11-10 ENCOUNTER — DOCUMENTATION ONLY (OUTPATIENT)
Dept: FAMILY MEDICINE CLINIC | Age: 67
End: 2017-11-10

## 2017-11-14 NOTE — TELEPHONE ENCOUNTER
Patient stated  mucinex is elevating her blood pressure and causing her to have headaches. Would like to try something else.

## 2017-11-14 NOTE — TELEPHONE ENCOUNTER
LPN please call patient to inform. Okay for patient to try Over the counter robitussin without pseudoephedrine and Over the counter nasal saline spray. If not improved with above or worse symptoms needs to return for re-evaluation and possible start of antibiotics. Judy Jarquin M.D.   [address] [phone]  [facility] [phone]

## 2017-11-15 NOTE — TELEPHONE ENCOUNTER
Spoke with patient, confirmed name and . Advised patient of below message and stated to call back on Monday if sxs have not improved, per Dr. Nancy Vásquez. Patient verbalized understanding.      Be advised

## 2017-11-21 ENCOUNTER — TELEPHONE (OUTPATIENT)
Dept: FAMILY MEDICINE CLINIC | Age: 67
End: 2017-11-21

## 2017-11-21 DIAGNOSIS — J40 BRONCHITIS: Primary | ICD-10-CM

## 2017-11-21 RX ORDER — AZITHROMYCIN 250 MG/1
TABLET, FILM COATED ORAL
Qty: 6 TAB | Refills: 0 | Status: SHIPPED | OUTPATIENT
Start: 2017-11-21 | End: 2017-11-26

## 2017-11-21 RX ORDER — BENZONATATE 150 MG/1
200 CAPSULE ORAL
Qty: 42 CAP | Refills: 0 | Status: SHIPPED | OUTPATIENT
Start: 2017-11-21 | End: 2017-12-05

## 2017-11-21 NOTE — TELEPHONE ENCOUNTER
Pt requesting antibotic or rx cough medicine. Pt states has tried different cough medications with no relief. States cough and mucus has not improved.

## 2017-11-21 NOTE — TELEPHONE ENCOUNTER
Called to discuss persistent cough and congestion. Rx benzonatate 100mg TID and mucinex ER 1200mg BID for total of 7 days written 11/6/2017. Unclear if above helpful and just needs refill or no help or minimal help with above medicines. No answer when called. Left VM for patient to return call and give best time and number to be reached and provider will attempt to all back at that time. If symptoms worse with fevers and chills, trouble breathing patient needs to report urgnetly to hospital or urgent care. Awaiting call back. Kiesha Messer M.D.   [address], [address] [place] [address], [address] [place] [address] [phone]  [facility] [phone]

## 2017-11-21 NOTE — TELEPHONE ENCOUNTER
Pt reports cough is not better but not worse. Some mild help with Tesalon helpful a little. Mucinex   MG BID tolerable, 2 tabs causes headache. No fever, chills, chest pain or shortness of breath    Bronchitis;  - Mucinex 600mg BID  - tesalon 150mg TID prn  - Zpack x 5 days  - Given return precautions    Chetna Baxter M.D.   [address], [address] [place] [address], [address] [place] [address] -   [facility] [phone]

## 2017-12-04 RX ORDER — LISINOPRIL 10 MG/1
10 TABLET ORAL DAILY
Qty: 30 TAB | Refills: 2 | Status: SHIPPED | OUTPATIENT
Start: 2017-12-04 | End: 2018-03-05 | Stop reason: SDUPTHER

## 2017-12-04 NOTE — TELEPHONE ENCOUNTER
Requested Prescriptions     Pending Prescriptions Disp Refills    lisinopril (PRINIVIL, ZESTRIL) 10 mg tablet  1

## 2017-12-07 ENCOUNTER — TELEPHONE (OUTPATIENT)
Dept: FAMILY MEDICINE CLINIC | Age: 67
End: 2017-12-07

## 2017-12-07 NOTE — TELEPHONE ENCOUNTER
Niko Soni with [facility] requesting [facility] referral. Dr. Brendalyn Lesch, [address] [id number], P: [phone], F: [phone], [address] [place] [address], [address] [place] [address]. Initial Consult, Diagnosis: Abnormal urine. She was not able to give me a dx code stating the pt has not been seen yet.

## 2017-12-08 NOTE — TELEPHONE ENCOUNTER
[address] [facility] Certified #[id number], faxed to [facility], confirmation received.

## 2018-01-08 PROBLEM — R91.8 GROUND GLASS OPACITY PRESENT ON IMAGING OF LUNG: Status: ACTIVE | Noted: 2018-01-08

## 2018-02-13 ENCOUNTER — TELEPHONE (OUTPATIENT)
Dept: BEHAVIORAL/MENTAL HEALTH CLINIC | Age: 68
End: 2018-02-13

## 2018-02-13 NOTE — TELEPHONE ENCOUNTER
LPN please call patient and inform of need to schedule f/u 4-6 weeks for general health maintenance and discuss abnormal image findings during visit with urology. CT UROGRAM 12/2017 4.  Mild subpleural groundglass right lower lobe may represent atelectasis, infection/inflammation or slow growing malignancy.  Correlate with prior imaging if available to document stability.  If not available, recommend followup chest CT in 6-12 months, as below. Kathy Gimenez M.D.   [place] [phone]  [place] [phone]

## 2018-02-14 ENCOUNTER — TELEPHONE (OUTPATIENT)
Dept: FAMILY MEDICINE CLINIC | Age: 68
End: 2018-02-14

## 2018-02-14 NOTE — TELEPHONE ENCOUNTER
Encourage patient to schedule a f/u for health maintence and for abnormal image findings with Urology. Patient was transferred to .

## 2018-03-05 ENCOUNTER — OFFICE VISIT (OUTPATIENT)
Dept: FAMILY MEDICINE CLINIC | Age: 68
End: 2018-03-05

## 2018-03-05 VITALS
TEMPERATURE: 97.3 F | HEART RATE: 80 BPM | BODY MASS INDEX: 28.28 KG/M2 | WEIGHT: 159.6 LBS | SYSTOLIC BLOOD PRESSURE: 161 MMHG | DIASTOLIC BLOOD PRESSURE: 85 MMHG | HEIGHT: 63 IN | RESPIRATION RATE: 18 BRPM | OXYGEN SATURATION: 98 %

## 2018-03-05 DIAGNOSIS — E55.9 VITAMIN D INSUFFICIENCY: ICD-10-CM

## 2018-03-05 DIAGNOSIS — R73.03 PRE-DIABETES: ICD-10-CM

## 2018-03-05 DIAGNOSIS — I10 ESSENTIAL HYPERTENSION: ICD-10-CM

## 2018-03-05 DIAGNOSIS — R91.8 GROUND GLASS OPACITY PRESENT ON IMAGING OF LUNG: Primary | ICD-10-CM

## 2018-03-05 DIAGNOSIS — E78.5 HYPERLIPIDEMIA, UNSPECIFIED HYPERLIPIDEMIA TYPE: ICD-10-CM

## 2018-03-05 RX ORDER — EZETIMIBE 10 MG/1
10 TABLET ORAL DAILY
Qty: 30 TAB | Refills: 2 | Status: SHIPPED | OUTPATIENT
Start: 2018-03-05 | End: 2018-07-31 | Stop reason: SDUPTHER

## 2018-03-05 RX ORDER — ATORVASTATIN CALCIUM 10 MG/1
TABLET, FILM COATED ORAL
COMMUNITY
Start: 2018-02-14 | End: 2018-03-05

## 2018-03-05 RX ORDER — LISINOPRIL 10 MG/1
10 TABLET ORAL DAILY
Qty: 30 TAB | Refills: 2 | Status: SHIPPED | OUTPATIENT
Start: 2018-03-05 | End: 2018-07-31 | Stop reason: SDUPTHER

## 2018-03-05 NOTE — PROGRESS NOTES
Internal Medicine Follow Up Visit Note    Chief Complaint   Patient presents with    Abnormal Lab Results       HPI:  Lin Marie is a 79 y.o.  female with history significant for HLD, prediabetes is here for the above complaint(s). Ground Glass mild subpleural opacities in RLL: CT urogram 12/2017. Intermittent coughing. Some SOB with rushing ,not at rest. 30 minutes on treadmill without problem. No wheezing. No h/o fungal pneumonia in past. Around 11/2017 had bad flu and increased mucus. CT UROGRAM 12/2017: 4.  Mild subpleural groundglass right lower lobe may represent atelectasis, infection/inflammation or slow growing malignancy.  Correlate with prior imaging if available to document stability.  If not available, recommend followup chest CT in 6-12 months, as below. ROS: as per HPI    Current Outpatient Prescriptions   Medication Sig    ezetimibe (ZETIA) 10 mg tablet Take 1 Tab by mouth daily for 90 days.  lisinopril (PRINIVIL, ZESTRIL) 10 mg tablet Take 1 Tab by mouth daily for 90 days.  cholecalciferol, VITAMIN D3, (VITAMIN D3) 5,000 unit tab tablet Take  by mouth daily.  ONETOUCH DELICA LANCETS 30 gauge misc     Blood-Glucose Meter monitoring kit Use to test sugar when symptoms occur    Lancets misc Use when symptoms occur    glucose blood VI test strips (ASCENSIA AUTODISC VI, ONE TOUCH ULTRA TEST VI) strip Use when symptoms occur    Calcium-Cholecalciferol, D3, (CALCIUM 600 WITH VITAMIN D3) 600 mg(1,500mg) -400 unit chew Take  by mouth.  COD LIVER OIL, BULK, by Does Not Apply route.  acetaminophen (TYLENOL) 500 mg tablet Take 1 Tab by mouth two (2) times a day. No current facility-administered medications for this visit.       Health Maintenance   Topic Date Due    Pneumococcal 65+ Low/Medium Risk (2 of 2 - PCV13) 09/14/2016    DTaP/Tdap/Td series (2 - Td) 12/31/2016    BREAST CANCER SCRN MAMMOGRAM  08/09/2018    MEDICARE YEARLY EXAM  08/26/2018  GLAUCOMA SCREENING Q2Y  08/31/2019    COLONOSCOPY  09/14/2020    Hepatitis C Screening  Completed    OSTEOPOROSIS SCREENING (DEXA)  Completed    ZOSTER VACCINE AGE 60>  Completed    Influenza Age 5 to Adult  Addressed     Immunization History   Administered Date(s) Administered    Influenza Vaccine 09/14/2015    Pneumococcal Polysaccharide (PPSV-23) 09/14/2015       Allergies and Medications: Reviewed and updated in EMR. Past Medical History:   Diagnosis Date    Abnormal urine cytology     Carpal tunnel syndrome, bilateral     Environmental allergies     Gross hematuria     Hearing loss, left 2017    has hearing aid    History of hematuria     HLD (hyperlipidemia) 2010    HTN (hypertension)     Menopause     Microscopic hematuria 2013    Osteoporosis 2014    Urgency incontinence     Vitamin D insufficiency 2010       Family History, Social History, Past Medical History, Surgical History: Reviewed and updated in EMR as appropriate. OBJECTIVE:   Visit Vitals    /85    Pulse 80    Temp 97.3 °F (36.3 °C) (Oral)    Resp 18    Ht 5' 3\" (1.6 m)    Wt 159 lb 9.6 oz (72.4 kg)    SpO2 98%    BMI 28.27 kg/m2        BP Readings from Last 3 Encounters:   03/05/18 161/85   02/13/18 136/80   12/12/17 (!) 128/92     Wt Readings from Last 3 Encounters:   03/05/18 159 lb 9.6 oz (72.4 kg)   02/13/18 155 lb (70.3 kg)   12/12/17 155 lb (70.3 kg)       General: Pleasant late middle aged woman in no acute distress  HEENT: Head is atraumatic normo-cephalic. CVS:  Heart regular, rate, and rhythm. Audible S1 and S2. No murmurs, rubs or gallops. PULM: Lungs clear to auscultation bilaterally. No wheezes, rales or rhonchi. MSE: mood euthymic, affect congruent and reactive. Nursing Notes Reviewed.     LABS/RADIOLOGICAL TESTS:      Lab Results   Component Value Date/Time    WBC 4.8 06/21/2010 10:24 AM    HGB 13.4 06/21/2010 10:24 AM    HCT 40.2 06/21/2010 10:24 AM    PLATELET 482 2010 10:24 AM     Lab Results   Component Value Date/Time    Sodium 144 2017 10:41 AM    Potassium 3.8 2017 10:41 AM    Chloride 103 2017 10:41 AM    CO2 27 2017 10:41 AM    Glucose 140 (H) 2017 10:41 AM    BUN 9 2017 10:41 AM    Creatinine 0.64 2017 10:41 AM     Lab Results   Component Value Date/Time    Cholesterol, total 203 (H) 2017 10:25 AM    HDL Cholesterol 65 2017 10:25 AM    LDL, calculated 125 (H) 2017 10:25 AM    Triglyceride 66 2017 10:25 AM     Results   CT UROGRAM W WO CONT (Order [id number])   Allergies      High: Alendronate   Unspecified: Lipitor [Atorvastatin]; Losartan;  Simvastatin   Result Information   Status Provider Status      Final result (Resulted: 2017) Reviewed    Result Narrative   [place]a In[place]: [facility] CAT SCAN  [address] [facility] [address]  [phone]      Imaging Result  Name: Sourav Baptiste ([id number])  Sex: Female   : 1950, 79year old   Patient Class: Outpatient Private   Diagnosis: Hematuria [R31.9 (ICD-10-CM)]         Procedures Performed: Exam Date/Time: Reason for Exam:  CT UROGRAM  [id number] 2017 10:25 AM  None Specified     CT abdomen and pelvis unenhanced and enhanced     INDICATION: Hematuria.  No cancer history.     TECHNIQUE: 2.5 mm collimation unenhanced axial images obtained from lung base to the level of the pubic symphysis without intravenous contrast. This was followed by 2.5 mm collimation axial images of the abdomen during the cortical phase of enhancement after injection of 100cc's Omnipaque 350 non-ionic intravenous contrast. Delayed excretory images were obtained through the abdomen and pelvis.  MIP and 3-D computer reformatted thin and thick section were then created on an independent workstation in order to further evaluate the urinary system and to minimize the radiation dose.     All CT scans at this facility are performed using dose optimization technique as appropriate to perform the exam, to include automated exposure control, adjustment of the mA and/or kV according to patient size (including appropriate matching for site-specific examinations), or use of interactive reconstruction technique.     COMPARISON: None     FINDINGS:   Kidneys: No renal or ureteral calculi.  Bilateral pelviectasis and mild right caliectasis. There is a nonenhancing 1.5 cm lesion at the interpolar left kidney.  A few smaller left renal lesions are too small to characterize.  No solid mass.     Urinary collecting system: Proximal right ureter is not well-opacified for assessment.  Mild blunting of the right renal calyces throughout.  No filling defect in opacified portions of renal and ureteral collecting systems.     Bladder: Bladder is well distended and without focal nodularity or filling defect.     ABDOMEN FINDINGS:   Liver: No focal mass. Spleen: No splenomegaly. Biliary: Gallbladder unremarkable. Pancreas: No focal mass.    Bowel: Normal in caliber and without wall thickening or pericolonic stranding. Normal appendix. Peritoneum/ Retroperitoneum: No free fluid or free air. Lymph nodes:  No lymphadenopathy. Adrenal glands: No focal nodule. Vessels: Lynmunaa Simmers is normal in caliber with mild atherosclerosis.       PELVIS FINDINGS:  Pelvic organs:  Multiple fibroids distort uterine contours.  No suspicious adnexal lesions. .   Lung bases:1.6 cm subpleural groundglass right lower lobe.   Bones: No suspicious lytic or blastic bony lesions.  Lumbar facet hypertrophy and arthropathy.  Grade 1 anterolisthesis L4-L5. IMPRESSION:   1. Mild right pelvocaliectasis with blunting of the calyces suggesting chronic obstruction and less likely renal papillary necrosis given the diffuse right-sided involvement. 2.  Mild nonspecific left pelviectasis. 3.  Fibroid uterus.   4.  Mild subpleural groundglass right lower lobe may represent atelectasis, infection/inflammation or slow growing malignancy.  Correlate with prior imaging if available to document stability.  If not available, recommend followup chest CT in 6-12 months, as below.     Updated Fleischner 2017 recommendations     Sub-solid nodules:  Single:  Greater than 6 mm:  -Groundglass: CT at 6-12 months to confirm persistence, then CT every 2 years until 5 years. Dictated by: Spring Lights on Mon Dec 11, 2017  1:23:00 PM EST    Signed By:Kin Cadena MD  12/11/2017  1:53 PM           All lab results and radiological studies were reviewed and discussed with the patient. ASSESSMENT/PLAN:      ICD-10-CM ICD-9-CM    1. Ground glass opacity present on imaging of lung: Asymptomatic R91.8 793.19 CT CHEST WO CONT June 2018   2. Hyperlipidemia, unspecified hyperlipidemia type E78.5 272.4 ezetimibe (ZETIA) 10 mg tablet  Low animal fat diet  Exercise encouraged      LIPID PANEL recheck in 3 months   3. Vitamin D insufficiency E55.9 268.9 VITAMIN D, 25 HYDROXY   4. Essential hypertension X08 231.8 METABOLIC PANEL, COMPREHENSIVE   5. Pre-diabetes U42.30 129.92 METABOLIC PANEL, COMPREHENSIVE      HEMOGLOBIN A1C WITH EAG       Requested Prescriptions     Signed Prescriptions Disp Refills    ezetimibe (ZETIA) 10 mg tablet 30 Tab 2     Sig: Take 1 Tab by mouth daily for 90 days.  lisinopril (PRINIVIL, ZESTRIL) 10 mg tablet 30 Tab 2     Sig: Take 1 Tab by mouth daily for 90 days. Patient verbalized understanding and agreement with the plan. Patient was given an after-visit summary. Follow-up Disposition:  Return in about 3 months (around 6/5/2018), or if symptoms worsen or fail to improve, for pre-diabetes, HTN, HLD. or sooner if worsening symptoms. More than 50% of this 25 min visit was spent counseling the patient face to face about etiology and treatment of health conditions outlined in assessment and plan. Tamara Hernandez M.D.   [address] [place] [address] [facility]  [address] [place], [address] [place] [address], [address] [place] [address] [phone]  [place] [phone]

## 2018-03-05 NOTE — MR AVS SNAPSHOT
[address] [address] [place] [address] 
[phone] Patient: Jeramy Suggs MRN: [id number] S:[id number] Visit Information Date & Time Provider Department Dept. Phone Encounter #  
 3/5/2018  4:00 PM Say Carrera MD [address] [phone] [id number] Follow-up Instructions Return in about 3 months (around 6/5/2018), or if symptoms worsen or fail to improve, for pre-diabetes, HTN, HLD. Upcoming Health Maintenance Date Due Pneumococcal 65+ Low/Medium Risk (2 of 2 - PCV13) 9/14/2016 DTaP/Tdap/Td series (2 - Td) 12/31/2016 BREAST CANCER SCRN MAMMOGRAM 8/9/2018 MEDICARE YEARLY EXAM 8/26/2018 GLAUCOMA SCREENING Q2Y 8/31/2019 COLONOSCOPY 9/14/2020 Allergies as of 3/5/2018  Review Complete On: 3/5/2018 By: Say Carrera MD  
  
 Severity Noted Reaction Type Reactions Alendronate High 02/23/2016    Anaphylaxis Angioedema documented by prior PCP 4/19/2017 Lipitor [Atorvastatin]  03/05/2018    Palpitations Losartan  07/31/2017    Palpitations Noted  [facility] 2014 Simvastatin  08/08/2017    Palpitations Current Immunizations  Never Reviewed Name Date Influenza Vaccine 9/14/2015 12:00 AM  
 Pneumococcal Polysaccharide (PPSV-23) 9/14/2015 12:00 AM  
  
 Not reviewed this visit You Were Diagnosed With   
  
 Codes Comments Ground glass opacity present on imaging of lung    -  Primary ICD-10-CM: R91.8 ICD-9-CM: 793.19 Hyperlipidemia, unspecified hyperlipidemia type     ICD-10-CM: E78.5 ICD-9-CM: 272.4 Vitamin D insufficiency     ICD-10-CM: E55.9 ICD-9-CM: 268.9 Essential hypertension     ICD-10-CM: I10 
ICD-9-CM: 401.9 Pre-diabetes     ICD-10-CM: R73.03 
ICD-9-CM: 790.29 Vitals BP Pulse Temp Resp Height(growth percentile) Weight(growth percentile) 161/85 80 97.3 °F (36.3 °C) (Oral) 18 5' 3\" (1.6 m) 159 lb 9.6 oz (72.4 kg) SpO2 BMI OB Status Smoking Status 98% 28.27 kg/m2 Postmenopausal Former Smoker Vitals History BMI and BSA Data Body Mass Index Body Surface Area  
 28.27 kg/m 2 1.79 m 2 Preferred Pharmacy Pharmacy Name Phone Mirian Canales [address], [address] [phone] Your Updated Medication List  
  
   
This list is accurate as of 3/5/18  5:03 PM.  Always use your most recent med list.  
  
  
  
  
 acetaminophen 500 mg tablet Commonly known as:  TYLENOL Take 1 Tab by mouth two (2) times a day. Blood-Glucose Meter monitoring kit Use to test sugar when symptoms occur CALCIUM 600 WITH VITAMIN D3 600 mg(1,500mg) -400 unit Chew Generic drug:  Calcium-Cholecalciferol (D3) Take  by mouth. COD LIVER OIL (BULK)  
by Does Not Apply route.  
  
 ezetimibe 10 mg tablet Commonly known as:  Genaro Kelly Take 1 Tab by mouth daily for 90 days. glucose blood VI test strips strip Commonly known as:  ASCENSIA AUTODISC VI, ONE TOUCH ULTRA TEST VI  
Use when symptoms occur * Lancets Misc Use when symptoms occur * ONETOUCH DELICA LANCETS 30 gauge Misc Generic drug:  lancets VITAMIN D3 5,000 unit Tab tablet Generic drug:  cholecalciferol (VITAMIN D3) Take  by mouth daily. * Notice: This list has 2 medication(s) that are the same as other medications prescribed for you. Read the directions carefully, and ask your doctor or other care provider to review them with you. Prescriptions Sent to Pharmacy Refills  
 ezetimibe (ZETIA) 10 mg tablet 2 Sig: Take 1 Tab by mouth daily for 90 days. Class: Normal  
 Pharmacy: [facility] [address], [address] Ph #: [phone]  Route: Oral  
  
 Follow-up Instructions Return in about 3 months (around 6/5/2018), or if symptoms worsen or fail to improve, for pre-diabetes, HTN, HLD. To-Do List   
 03/05/2018 Lab:  HEMOGLOBIN A1C WITH EAG   
  
 03/05/2018 Lab:  LIPID PANEL   
  
 03/05/2018 Lab:  METABOLIC PANEL, COMPREHENSIVE   
  
 03/05/2018 Lab:  VITAMIN D, 25 HYDROXY   
  
 06/01/2018 Imaging:  CT CHEST WO CONT Patient Instructions Call Insurance: See where pneumonia (prevnar 13) vaccine is covered and get either at pharmacy or doctors office We will order CT scan of lungs in 6/2018, if worsening cough or shortness of breath please come in sooner for evaluation Return to give blood at least 1 week before next appointment, fasting, no food after MN, at least 8 hours, medicines and water okay For cholesterol: 
Stop taking lipitor Start taking ezetimibe 10 mg daily High Cholesterol: Care Instructions Your Care Instructions Cholesterol is a type of fat in your blood. It is needed for many body functions, such as making new cells. Cholesterol is made by your body. It also comes from food you eat. High cholesterol means that you have too much of the fat in your blood. This raises your risk of a heart attack and stroke. LDL and HDL are part of your total cholesterol. LDL is the \"bad\" cholesterol. High LDL can raise your risk for heart disease, heart attack, and stroke. HDL is the \"good\" cholesterol. It helps clear bad cholesterol from the body. High HDL is linked with a lower risk of heart disease, heart attack, and stroke. Your cholesterol levels help your doctor find out your risk for having a heart attack or stroke. You and your doctor can talk about whether you need to lower your risk and what treatment is best for you. A heart-healthy lifestyle along with medicines can help lower your cholesterol and your risk.  The way you choose to lower your risk will depend on how high your risk is for heart attack and stroke. It will also depend on how you feel about taking medicines. Follow-up care is a key part of your treatment and safety. Be sure to make and go to all appointments, and call your doctor if you are having problems. It's also a good idea to know your test results and keep a list of the medicines you take. How can you care for yourself at home? · Eat a variety of foods every day. Good choices include fruits, vegetables, whole grains (like oatmeal), dried beans and peas, nuts and seeds, soy products (like tofu), and fat-free or low-fat dairy products. · Replace butter, margarine, and hydrogenated or partially hydrogenated oils with olive and canola oils. (Canola oil margarine without trans fat is fine.) · Replace red meat with fish, poultry, and soy protein (like tofu). · Limit processed and packaged foods like chips, crackers, and cookies. · Bake, broil, or steam foods. Don't balderas them. · Be physically active. Get at least 30 minutes of exercise on most days of the week. Walking is a good choice. You also may want to do other activities, such as running, swimming, cycling, or playing tennis or team sports. · Stay at a healthy weight or lose weight by making the changes in eating and physical activity listed above. Losing just a small amount of weight, even 5 to 10 pounds, can reduce your risk for having a heart attack or stroke. · Do not smoke. When should you call for help? Watch closely for changes in your health, and be sure to contact your doctor if: 
? · You need help making lifestyle changes. ? · You have questions about your medicine. Where can you learn more? Go to http://james-anirudh.info/. Enter R386 in the search box to learn more about \"High Cholesterol: Care Instructions. \" Current as of: September 21, 2016 Content Version: 11.4 © 1764-2665 Healthwise, Incorporated.  Care instructions adapted under license by [address] (which disclaims liability or warranty for this information). If you have questions about a medical condition or this instruction, always ask your healthcare professional. [address] any warranty or liability for your use of this information. Ezetimibe (By mouth) Ezetimibe (l-ZAC-a-mibe) Lowers high cholesterol levels. Brand Name(s): Zetia There may be other brand names for this medicine. When This Medicine Should Not Be Used: This medicine is not right for everyone. Do not use it if you had an allergic reaction to ezetimibe. How to Use This Medicine:  
Tablet · Take your medicine as directed. Your dose may need to be changed several times to find what works best for you. · If you also use cholestyramine, take it at least 2 hours after or 4 hours before you take ezetimibe. · Missed dose: Take a dose as soon as you remember. If it is almost time for your next dose, wait until then and take a regular dose. Do not take extra medicine to make up for a missed dose. · Store the medicine in a closed container at room temperature, away from heat, moisture, and direct light. Drugs and Foods to Avoid: Ask your doctor or pharmacist before using any other medicine, including over-the-counter medicines, vitamins, and herbal products. · Do not use this medicine together with a statin medicine if you are pregnant or breastfeeding, or if you have liver disease. · Some medicines can affect how ezetimibe works. Tell your doctor if you are using any of the following: ¨ Cyclosporine ¨ Cholestyramine ¨ Fenofibrate ¨ Gemfibrozil ¨ A blood thinner, such as warfarin Warnings While Using This Medicine: · Tell your doctor if you are pregnant or breastfeeding, or if you have liver disease, kidney disease, or thyroid problems. · This medicine may cause myopathy or rhabdomyolysis, which are serious muscle problems. · Your doctor will check your progress and the effects of this medicine at regular visits. Keep all appointments. · Keep all medicine out of the reach of children. Never share your medicine with anyone. Possible Side Effects While Using This Medicine:  
Call your doctor right away if you notice any of these side effects: · Allergic reaction: Itching or hives, swelling in your face or hands, swelling or tingling in your mouth or throat, chest tightness, trouble breathing · Muscle pain, tenderness, or weakness · Nausea, vomiting, loss of appetite, stomach pain, yellow skin or eyes If you notice other side effects that you think are caused by this medicine, tell your doctor. Call your doctor for medical advice about side effects. You may report side effects to FDA at 8-108-FDA-1138 © 2017 [address]  Information is for End User's use only and may not be sold, redistributed or otherwise used for commercial purposes. The above information is an  only. It is not intended as medical advice for individual conditions or treatments. Talk to your doctor, nurse or pharmacist before following any medical regimen to see if it is safe and effective for you. Introducing [facility]! [employer] introduces [employer] patient portal. Now you can access parts of your medical record, email your doctor's office, and request medication refills online. 1. In your internet browser, go to [email or web address] 2. Click on the First Time User? Click Here link in the Sign In box. You will see the New Member Sign Up page. 3. Enter your [employer] Access Code exactly as it appears below. You will not need to use this code after youve completed the sign-up process. If you do not sign up before the expiration date, you must request a new code. · [employer] Access Code: [email or web address] Expires: 5/14/2018 10:59 AM 
 
 4. Enter the last four digits of your Social Security Number (xxxx) and Date of Birth (mm/dd/yyyy) as indicated and click Submit. You will be taken to the next sign-up page. 5. Create a ProNerve ID. This will be your ProNerve login ID and cannot be changed, so think of one that is secure and easy to remember. 6. Create a ProNerve password. You can change your password at any time. 7. Enter your Password Reset Question and Answer. This can be used at a later time if you forget your password. 8. Enter your e-mail address. You will receive e-mail notification when new information is available in [address] 9. Click Sign Up. You can now view and download portions of your medical record. 10. Click the Download Summary menu link to download a portable copy of your medical information. If you have questions, please visit the Frequently Asked Questions section of the Pro[employer] website. Remember, [employer]erve is NOT to be used for urgent needs. For medical emergencies, dial 911. Now available from your iPhone and Android! Please provide this summary of care documentation to your next provider. Your primary care clinician is listed as Amy Landrum. If you have any questions after today's visit, please call [phone].

## 2018-03-05 NOTE — PROGRESS NOTES
Chief Complaint   Patient presents with    Abnormal Lab Results     1. Have you been to the ER, urgent care clinic since your last visit? Hospitalized since your last visit? No    2. Have you seen or consulted any other health care providers outside of the [address] [place] [address] since your last visit? Include any pap smears or colon screening.  No

## 2018-03-05 NOTE — PATIENT INSTRUCTIONS
Call Insurance: See where pneumonia (prevnar 13) vaccine is covered and get either at pharmacy or doctors office    We will order CT scan of lungs in 6/2018, if worsening cough or shortness of breath please come in sooner for evaluation    Return to give blood at least 1 week before next appointment, fasting, no food after MN, at least 8 hours, medicines and water okay    For cholesterol:  Stop taking lipitor  Start taking ezetimibe 10 mg daily        High Cholesterol: Care Instructions  Your Care Instructions    Cholesterol is a type of fat in your blood. It is needed for many body functions, such as making new cells. Cholesterol is made by your body. It also comes from food you eat. High cholesterol means that you have too much of the fat in your blood. This raises your risk of a heart attack and stroke. LDL and HDL are part of your total cholesterol. LDL is the \"bad\" cholesterol. High LDL can raise your risk for heart disease, heart attack, and stroke. HDL is the \"good\" cholesterol. It helps clear bad cholesterol from the body. High HDL is linked with a lower risk of heart disease, heart attack, and stroke. Your cholesterol levels help your doctor find out your risk for having a heart attack or stroke. You and your doctor can talk about whether you need to lower your risk and what treatment is best for you. A heart-healthy lifestyle along with medicines can help lower your cholesterol and your risk. The way you choose to lower your risk will depend on how high your risk is for heart attack and stroke. It will also depend on how you feel about taking medicines. Follow-up care is a key part of your treatment and safety. Be sure to make and go to all appointments, and call your doctor if you are having problems. It's also a good idea to know your test results and keep a list of the medicines you take. How can you care for yourself at home? · Eat a variety of foods every day.  Good choices include fruits, vegetables, whole grains (like oatmeal), dried beans and peas, nuts and seeds, soy products (like tofu), and fat-free or low-fat dairy products. · Replace butter, margarine, and hydrogenated or partially hydrogenated oils with olive and canola oils. (Canola oil margarine without trans fat is fine.)  · Replace red meat with fish, poultry, and soy protein (like tofu). · Limit processed and packaged foods like chips, crackers, and cookies. · Bake, broil, or steam foods. Don't balderas them. · Be physically active. Get at least 30 minutes of exercise on most days of the week. Walking is a good choice. You also may want to do other activities, such as running, swimming, cycling, or playing tennis or team sports. · Stay at a healthy weight or lose weight by making the changes in eating and physical activity listed above. Losing just a small amount of weight, even 5 to 10 pounds, can reduce your risk for having a heart attack or stroke. · Do not smoke. When should you call for help? Watch closely for changes in your health, and be sure to contact your doctor if:  ? · You need help making lifestyle changes. ? · You have questions about your medicine. Where can you learn more? Go to http://james-anirudh.info/. Enter B231 in the search box to learn more about \"High Cholesterol: Care Instructions. \"  Current as of: September 21, 2016  Content Version: 11.4  © 5212-7454 [employer]. Care instructions adapted under license by [employer] (which disclaims liability or warranty for this information). If you have questions about a medical condition or this instruction, always ask your healthcare professional. [place] [address] any warranty or liability for your use of this information. Ezetimibe (By mouth)   Ezetimibe (p-PQA-q-mibe)  Lowers high cholesterol levels. Brand Name(s): Zetia   There may be other brand names for this medicine.   When This Medicine Should Not Be Used: This medicine is not right for everyone. Do not use it if you had an allergic reaction to ezetimibe. How to Use This Medicine:   Tablet  · Take your medicine as directed. Your dose may need to be changed several times to find what works best for you. · If you also use cholestyramine, take it at least 2 hours after or 4 hours before you take ezetimibe. · Missed dose: Take a dose as soon as you remember. If it is almost time for your next dose, wait until then and take a regular dose. Do not take extra medicine to make up for a missed dose. · Store the medicine in a closed container at room temperature, away from heat, moisture, and direct light. Drugs and Foods to Avoid:   Ask your doctor or pharmacist before using any other medicine, including over-the-counter medicines, vitamins, and herbal products. · Do not use this medicine together with a statin medicine if you are pregnant or breastfeeding, or if you have liver disease. · Some medicines can affect how ezetimibe works. Tell your doctor if you are using any of the following:   ¨ Cyclosporine  ¨ Cholestyramine  ¨ Fenofibrate  ¨ Gemfibrozil  ¨ A blood thinner, such as warfarin  Warnings While Using This Medicine:   · Tell your doctor if you are pregnant or breastfeeding, or if you have liver disease, kidney disease, or thyroid problems. · This medicine may cause myopathy or rhabdomyolysis, which are serious muscle problems. · Your doctor will check your progress and the effects of this medicine at regular visits. Keep all appointments. · Keep all medicine out of the reach of children. Never share your medicine with anyone.   Possible Side Effects While Using This Medicine:   Call your doctor right away if you notice any of these side effects:  · Allergic reaction: Itching or hives, swelling in your face or hands, swelling or tingling in your mouth or throat, chest tightness, trouble breathing  · Muscle pain, tenderness, or weakness  · Nausea, vomiting, loss of appetite, stomach pain, yellow skin or eyes  If you notice other side effects that you think are caused by this medicine, tell your doctor. Call your doctor for medical advice about side effects. You may report side effects to FDA at 6-307-LCY-0083  © 2017 [facility] Information is for End User's use only and may not be sold, redistributed or otherwise used for commercial purposes. The above information is an  only. It is not intended as medical advice for individual conditions or treatments. Talk to your doctor, nurse or pharmacist before following any medical regimen to see if it is safe and effective for you.

## 2018-03-20 ENCOUNTER — TELEPHONE (OUTPATIENT)
Dept: FAMILY MEDICINE CLINIC | Age: 68
End: 2018-03-20

## 2018-03-20 NOTE — TELEPHONE ENCOUNTER
Tanya Arias requesting to speak withDr Gonzalez or Nurse regarding pt's ct scan. States radiologist requesting previous ct scan to compare current CT scan. States per pt she had CT done in 2017 but does not remember where.

## 2018-03-22 NOTE — TELEPHONE ENCOUNTER
Spoke with [place] and informed her that the only CT were have on file is a CT Urogram order by [facility]. [place] asked if the CT scan could be faxed over to them [phone] fax. Called patient to find out if is ok for me to fax over last CT scan from 12/12/17.

## 2018-03-22 NOTE — TELEPHONE ENCOUNTER
CT faxed to [address] to [place] in [facility] 523 fax.

## 2018-03-22 NOTE — TELEPHONE ENCOUNTER
Results   CT Vera Sheppard WO CONT (Order [id number])   Allergies      High: Alendronate   Unspecified: Lipitor [Atorvastatin]; Losartan;  Simvastatin   Result Information   Status Provider Status      Final result (Resulted: 2017) Reviewed    Result Narrative   Dontae Gonzalez: Nelson Andersen      [facility] [address] CAT SCAN  [address] [place] [address]  [phone]      Imaging Result  Name: Ac Gastelum ([id number])  Sex: Female   : 1950, 79year old   Patient Class: Outpatient Private   Diagnosis: Hematuria [R31.9 (ICD-10-CM)]         Procedures Performed: Exam Date/Time: Reason for Exam:  CT UROGRAM  [id number] 2017 10:25 AM  None Specified     CT abdomen and pelvis unenhanced and enhanced     INDICATION: Hematuria.  No cancer history.     TECHNIQUE: 2.5 mm collimation unenhanced axial images obtained from lung base to the level of the pubic symphysis without intravenous contrast. This was followed by 2.5 mm collimation axial images of the abdomen during the cortical phase of enhancement after injection of 100cc's Omnipaque 350 non-ionic intravenous contrast. Delayed excretory images were obtained through the abdomen and pelvis. MIP and 3-D computer reformatted thin and thick section were then created on an independent workstation in order to further evaluate the urinary system and to minimize the radiation dose.     All CT scans at this facility are performed using dose optimization technique as appropriate to perform the exam, to include automated exposure control, adjustment of the mA and/or kV according to patient size (including appropriate matching for site-specific examinations), or use of interactive reconstruction technique.     COMPARISON: None     FINDINGS:   Kidneys: No renal or ureteral calculi.  Bilateral pelviectasis and mild right caliectasis.  There is a nonenhancing 1.5 cm lesion at the interpolar left kidney.  A few smaller left renal lesions are too small to characterize.  No solid mass.     Urinary collecting system: Proximal right ureter is not well-opacified for assessment.  Mild blunting of the right renal calyces throughout.  No filling defect in opacified portions of renal and ureteral collecting systems.     Bladder: Bladder is well distended and without focal nodularity or filling defect.     ABDOMEN FINDINGS:   Liver: No focal mass. Spleen: No splenomegaly. Biliary: Gallbladder unremarkable. Pancreas: No focal mass.    Bowel: Normal in caliber and without wall thickening or pericolonic stranding. Normal appendix. Peritoneum/ Retroperitoneum: No free fluid or free air. Lymph nodes:  No lymphadenopathy. Adrenal glands: No focal nodule. Vessels: Ankit [place] is normal in caliber with mild atherosclerosis.       PELVIS FINDINGS:  Pelvic organs:  Multiple fibroids distort uterine contours.  No suspicious adnexal lesions. .   Lung bases:1.6 cm subpleural groundglass right lower lobe.   Bones: No suspicious lytic or blastic bony lesions.  Lumbar facet hypertrophy and arthropathy.  Grade 1 anterolisthesis L4-L5. IMPRESSION:   1. Mild right pelvocaliectasis with blunting of the calyces suggesting chronic obstruction and less likely renal papillary necrosis given the diffuse right-sided involvement. 2.  Mild nonspecific left pelviectasis. 3.  Fibroid uterus. 4.  Mild subpleural groundglass right lower lobe may represent atelectasis, infection/inflammation or slow growing malignancy.  Correlate with prior imaging if available to document stability.  If not available, recommend followup chest CT in 6-12 months, as below.     Updated Fleischner 2017 recommendations     Sub-solid nodules:  Single:  Greater than 6 mm:  -Groundglass: CT at 6-12 months to confirm persistence, then CT every 2 years until 5 years.     Dictated by: Jose Antonio Mackay on Mon Dec 11, 2017  1:23:00 PM EST    Signed By:Chastity Cadena MD  12/11/2017  1:[address] Radiologist [[id number]]    ~~This report was copied and pasted from the servicing EHR system. ~~          LPN please fax to requested location. Sylwia Mehta M.D.   [employer]  [address] [place], [place] [address] [id number] [place], [place] [id number] [phone]

## 2018-04-19 ENCOUNTER — TELEPHONE (OUTPATIENT)
Dept: FAMILY MEDICINE CLINIC | Age: 68
End: 2018-04-19

## 2018-04-19 DIAGNOSIS — R91.8 GROUND GLASS OPACITY PRESENT ON IMAGING OF LUNG: ICD-10-CM

## 2018-04-19 NOTE — TELEPHONE ENCOUNTER
LPN please inform patient 3/2018 CT chest significant for similar findings on 12/2017 CT urogram, recommend follow up in 6 months to monitor for stability. Ordered. Can obtain at [facility] 9/2018. If she develops worsening shortness of breath, coughing, will need to follow up for evaluation in clinic . Jany Francis M.D. [address], [address] [place], [place] [address] [address] [place] [address] [phone]        3/20/2018 Results  CT CHEST WO CONT  Prior CT abdomen and pelvis dated 12/11/2017 was obtained from another  institution.     Scattered bilateral peripheral predominant groundglass opacities are nonspecific  but can be seen in the setting of infectious and inflammatory etiologies. The  appearance of these groundglass opacities on the current CT chest is similar to  the visualized lung bases included on the prior CT abdomen / pelvis. However,  since some of the groundglass opacities on current exam were not included in the  field of view of the prior CT, a follow up CT chest in 3-6 months is recommended  based on the Guidelines for Management of Incidental Pulmonary Nodules Detected  on CT Images: From the Fleischner Society 2017.      Subcentimeter nodule noted in the left breast.  Diagnostic mammogram and breast  ultrasound recommended.     Hypodensity in the left kidney is incompletely visualized on current exam but  was present on prior CT abdomen / pelvis; please refer to dictation for that  exam regarding recommendation.

## 2018-04-20 NOTE — TELEPHONE ENCOUNTER
Results placed in outgoing mail. Jany Francis M.D.   [employer]  [address] [place] [address], [address] [place] [address], [address] [place] [address] [phone]  [facility] [phone]

## 2018-05-22 ENCOUNTER — TELEPHONE (OUTPATIENT)
Dept: FAMILY MEDICINE CLINIC | Age: 68
End: 2018-05-22

## 2018-05-22 NOTE — TELEPHONE ENCOUNTER
LPN please call patient and inform should be an 8:15 AM available tomorrow for short notice. Deisy Duggan M.D.   [employer]  [address] [place] [address], [address] [place] [address], [address] [place], [place] [address] [phone]  [facility] [phone]

## 2018-05-22 NOTE — TELEPHONE ENCOUNTER
Pt stopped by the office and stated having really bad stiff neck. States cannot barley move it. Pt requesting appt but availability and pt requesting to see Dr Seng Warren. Pt requesting a muscle relaxant for stiffness.  States please call [phone] or [phone]

## 2018-05-22 NOTE — TELEPHONE ENCOUNTER
Spoke with patient and patient stated she will call at 8:00 to schedule appt. Patient had to get off phone due to emergency.

## 2018-05-23 ENCOUNTER — OFFICE VISIT (OUTPATIENT)
Dept: FAMILY MEDICINE CLINIC | Age: 68
End: 2018-05-23

## 2018-05-23 VITALS
SYSTOLIC BLOOD PRESSURE: 162 MMHG | HEIGHT: 63 IN | BODY MASS INDEX: 28 KG/M2 | OXYGEN SATURATION: 98 % | RESPIRATION RATE: 16 BRPM | DIASTOLIC BLOOD PRESSURE: 80 MMHG | HEART RATE: 81 BPM | TEMPERATURE: 97.8 F | WEIGHT: 158 LBS

## 2018-05-23 DIAGNOSIS — M62.838 NECK MUSCLE SPASM: Primary | ICD-10-CM

## 2018-05-23 RX ORDER — TIZANIDINE 2 MG/1
2 TABLET ORAL
Qty: 20 TAB | Refills: 0 | Status: SHIPPED | OUTPATIENT
Start: 2018-05-23 | End: 2018-06-22

## 2018-05-23 NOTE — PATIENT INSTRUCTIONS
Take tizanidine 2 mg twice a day as needed for spasm  Take tylenol extra strength twice a day as needed for pain  Do below exercises 2x per day  If no help after 6 weeks call and we can refer you to physical therapy  Get better neck support for sleep at night         Neck Spasm: Exercises  Your Care Instructions  Here are some examples of typical rehabilitation exercises for your condition. Start each exercise slowly. Ease off the exercise if you start to have pain. Your doctor or physical therapist will tell you when you can start these exercises and which ones will work best for you. How to do the exercises  Levator scapula stretch    1. Sit in a firm chair, or stand up straight. 2. Gently tilt your head toward your left shoulder. 3. Turn your head to look down into your armpit, bending your head slightly forward. Let the weight of your head stretch your neck muscles. 4. Hold for 15 to 30 seconds. 5. Return to your starting position. 6. Follow the same instructions above, but tilt your head toward your right shoulder. 7. Repeat 2 to 4 times toward each shoulder. Upper trapezius stretch    1. Sit in a firm chair, or stand up straight. 2. This stretch works best if you keep your shoulder down as you lean away from it. To help you remember to do this, start by relaxing your shoulders and lightly holding on to your thighs or your chair. 3. Tilt your head toward your shoulder and hold for 15 to 30 seconds. Let the weight of your head stretch your muscles. 4. If you would like a little added stretch, place your arm behind your back. Use the arm opposite of the direction you are tilting your head. For example, if you are tilting your head to the left, place your right arm behind your back. 5. Repeat 2 to 4 times toward each shoulder. Neck rotation    1. Sit in a firm chair, or stand up straight. 2. Keeping your chin level, turn your head to the right, and hold for 15 to 30 seconds.   3. Turn your head to the left, and hold for 15 to 30 seconds. 4. Repeat 2 to 4 times to each side. Chin tuck    1. Lie on the floor with a rolled-up towel under your neck. Your head should be touching the floor. 2. Slowly bring your chin toward the front of your neck. 3. Hold for a count of 6, and then relax for up to 10 seconds. 4. Repeat 8 to 12 times. Forward neck flexion    1. Sit in a firm chair, or stand up straight. 2. Bend your head forward. 3. Hold for 15 to 30 seconds, then return to your starting position. 4. Repeat 2 to 4 times. Follow-up care is a key part of your treatment and safety. Be sure to make and go to all appointments, and call your doctor if you are having problems. It's also a good idea to know your test results and keep a list of the medicines you take. Where can you learn more? Go to http://jamesMetro Telworksanirudh.info/. Enter P962 in the search box to learn more about \"Neck Spasm: Exercises. \"  Current as of: March 21, 2017  Content Version: 11.4  © 0879-8513 [employer]. Care instructions adapted under license by [employer] (which disclaims liability or warranty for this information). If you have questions about a medical condition or this instruction, always ask your healthcare professional. [address] any warranty or liability for your use of this information. Tizanidine (By mouth)   Tizanidine (sqg-UIE-t-deepak)  Treats muscle spasticity. Brand Name(s): Comfort Pac w/tiZANidine, Zanaflex, Zanaflex Capsule   There may be other brand names for this medicine. When This Medicine Should Not Be Used: This medicine is not right for everyone. Do not use if you had an allergic reaction to tizanidine. How to Use This Medicine:   Capsule, Tablet  · Take your medicine as directed. Your dose may need to be changed several times to find what works best for you.   · You may take this medicine with or without food, but always take it the same way every time. Tizanidine works differently depending on whether you take it on an empty stomach or a full stomach. Talk to your doctor if you have any questions about this. · Missed dose: Take a dose as soon as you remember. If it is almost time for your next dose, wait until then and take a regular dose. Do not take extra medicine to make up for a missed dose. · Store the medicine in a closed container at room temperature, away from heat, moisture, and direct light. Drugs and Foods to Avoid:   Ask your doctor or pharmacist before using any other medicine, including over-the-counter medicines, vitamins, and herbal products. · Do not use this medicine together with ciprofloxacin or fluvoxamine. · Some foods and medicines can affect how tizanidine works. Tell your doctor if you are using any of the following:  ¨ Acyclovir, baclofen, cimetidine, famotidine, ticlopidine, verapamil, zileuton  ¨ Birth control pills, blood pressure medicine, medicine for heart rhythm problems (such as amiodarone, mexiletine, propafenone), or medicine to treat an infection (such as levofloxacin, moxifloxacin)  · Do not drink alcohol while you are using this medicine. · Tell your doctor if you use anything else that makes you sleepy. Some examples are allergy medicine, narcotic pain medicine, and alcohol. Warnings While Using This Medicine:   · Tell your doctor if you are pregnant or breastfeeding, or if you have kidney disease or liver disease. · This medicine may cause the following problems:  ¨ Low blood pressure  ¨ Liver damage  · This medicine may make you dizzy or drowsy. Do not drive or do anything else that could be dangerous until you know how this medicine affects you. Stand or sit up slowly if you are dizzy. · Do not stop using this medicine suddenly. Your doctor will need to slowly decrease your dose before you stop it completely.   · Your doctor will do lab tests at regular visits to check on the effects of this medicine. Keep all appointments. · Keep all medicine out of the reach of children. Never share your medicine with anyone. Possible Side Effects While Using This Medicine:   Call your doctor right away if you notice any of these side effects:  · Allergic reaction: Itching or hives, swelling in your face or hands, swelling or tingling in your mouth or throat, chest tightness, trouble breathing  · Dark urine or pale stools, nausea, vomiting, loss of appetite, stomach pain, yellow skin or eyes  · Lightheadedness, dizziness, or fainting  · Seeing or hearing things that are not really there  · Slow heartbeat  If you notice these less serious side effects, talk with your doctor:   · Dry mouth  · Drowsiness or sleepiness  · Weakness  If you notice other side effects that you think are caused by this medicine, tell your doctor. Call your doctor for medical advice about side effects. You may report side effects to FDA at 4-983-FDA-9541  © 2017 [facility] Information is for End User's use only and may not be sold, redistributed or otherwise used for commercial purposes. The above information is an  only. It is not intended as medical advice for individual conditions or treatments. Talk to your doctor, nurse or pharmacist before following any medical regimen to see if it is safe and effective for you.

## 2018-05-23 NOTE — MR AVS SNAPSHOT
[address] [address] 
[phone] Patient: Elvin Mercedes MRN: [id number] Visit Information Date & Time Provider Department Dept. Phone Encounter #  
 5/23/2018  8:15 AM Lolis Cervantes MD [address] [phone] [id number] Follow-up Instructions Return in about 4 weeks (around 6/20/2018), or if symptoms worsen or fail to improve, for finger stingling. Upcoming Health Maintenance Date Due Bone Densitometry (Dexa) Screening 6/22/2015 Pneumococcal 65+ Low/Medium Risk (2 of 2 - PCV13) 9/14/2016 DTaP/Tdap/Td series (2 - Td) 12/31/2016 BREAST CANCER SCRN MAMMOGRAM 8/9/2018 Influenza Age 5 to Adult 8/1/2018 MEDICARE YEARLY EXAM 8/26/2018 GLAUCOMA SCREENING Q2Y 8/31/2019 COLONOSCOPY 9/14/2020 Allergies as of 5/23/2018  Review Complete On: 5/23/2018 By: Lolis Cervantes MD  
  
 Severity Noted Reaction Type Reactions Alendronate High 02/23/2016    Anaphylaxis Angioedema documented by prior PCP 4/19/2017 Lipitor [Atorvastatin]  03/05/2018    Palpitations Losartan  07/31/2017    Palpitations Noted  [facility] 2014 Simvastatin  08/08/2017    Palpitations Current Immunizations  Never Reviewed Name Date Influenza Vaccine 9/14/2015 12:00 AM  
 Pneumococcal Polysaccharide (PPSV-23) 9/14/2015 12:00 AM  
  
 Not reviewed this visit You Were Diagnosed With   
  
 Codes Comments Neck muscle spasm    -  Primary ICD-10-CM: F83.551 ICD-9-CM: 728.85 Vitals BP Pulse Temp Resp Height(growth percentile) Weight(growth percentile) 162/80 81 97.8 °F (36.6 °C) (Oral) 16 5' 3\" (1.6 m) 158 lb (71.7 kg) SpO2 BMI OB Status Smoking Status 98% 27.99 kg/m2 Postmenopausal Former Smoker Vitals History BMI and BSA Data  Body Mass Index Body Surface Area  
 27.99 kg/m 2 1.79 m 2  
  
  
 Preferred Pharmacy Pharmacy Name Phone Mirian Canales [address], [address] [phone] Your Updated Medication List  
  
   
This list is accurate as of 5/23/18  8:46 AM.  Always use your most recent med list.  
  
  
  
  
 acetaminophen 500 mg tablet Commonly known as:  TYLENOL Take 1 Tab by mouth two (2) times a day. Blood-Glucose Meter monitoring kit Use to test sugar when symptoms occur CALCIUM 600 WITH VITAMIN D3 600 mg(1,500mg) -400 unit Chew Generic drug:  Calcium-Cholecalciferol (D3) Take  by mouth. COD LIVER OIL (BULK)  
by Does Not Apply route.  
  
 ezetimibe 10 mg tablet Commonly known as:  Everett Baldy Take 1 Tab by mouth daily for 90 days. glucose blood VI test strips strip Commonly known as:  ASCENSIA AUTODISC VI, ONE TOUCH ULTRA TEST VI  
Use when symptoms occur * Lancets Misc Use when symptoms occur * ONETOUCH DELICA LANCETS 30 gauge Misc Generic drug:  lancets  
  
 lisinopril 10 mg tablet Commonly known as:  Therisa Semen Take 1 Tab by mouth daily for 90 days. tiZANidine 2 mg tablet Commonly known as:  Hosie Munda Take 1 Tab by mouth two (2) times daily as needed for up to 30 days. VITAMIN D3 5,000 unit Tab tablet Generic drug:  cholecalciferol (VITAMIN D3) Take  by mouth daily. * Notice: This list has 2 medication(s) that are the same as other medications prescribed for you. Read the directions carefully, and ask your doctor or other care provider to review them with you. Prescriptions Sent to Pharmacy Refills  
 tiZANidine (ZANAFLEX) 2 mg tablet 0 Sig: Take 1 Tab by mouth two (2) times daily as needed for up to 30 days. Class: Normal  
 Pharmacy: [facility] [address], [address] Ph #: [phone]  Route: Oral  
  
 Follow-up Instructions Return in about 4 weeks (around 6/20/2018), or if symptoms worsen or fail to improve, for finger stingling. To-Do List   
 10/08/2018  9:00 AM  
  Appointment with [address] at [address] ([phone]) GENERAL INSTRUCTIONS - If you have a hand-written prescription for this exam, you must bring it with you on the day of your exam. - Bring all relevant prior images from facilities outside of [facility]. Failure to provide images may delay reading by Radiologist. - Only patients will be allowed in the exam room. This includes children. - Children under the age of 15 may not be left unattended. - [address] [place] [address] patients must have an armband and be accompanied by a caregiver or family member. - Joanmarlin Cook may be responsible for any co-payments and deductibles as indicated by your insurance carrier. APPOINTMENT CANCELLATION - To reschedule or cancel an appointment, please contact the Scheduling Department at [phone] Patient Instructions Take tizanidine 2 mg twice a day as needed for spasm Take tylenol extra strength twice a day as needed for pain Do below exercises 2x per day If no help after 6 weeks call and we can refer you to physical therapy Get better neck support for sleep at night Neck Spasm: Exercises Your Care Instructions Here are some examples of typical rehabilitation exercises for your condition. Start each exercise slowly. Ease off the exercise if you start to have pain. Your doctor or physical therapist will tell you when you can start these exercises and which ones will work best for you. How to do the exercises Levator scapula stretch 1. Sit in a firm chair, or stand up straight. 2. Gently tilt your head toward your left shoulder. 3. Turn your head to look down into your armpit, bending your head slightly forward. Let the weight of your head stretch your neck muscles. 4. Hold for 15 to 30 seconds. 5. Return to your starting position. 6. Follow the same instructions above, but tilt your head toward your right shoulder. 7. Repeat 2 to 4 times toward each shoulder. Upper trapezius stretch 1. Sit in a firm chair, or stand up straight. 2. This stretch works best if you keep your shoulder down as you lean away from it. To help you remember to do this, start by relaxing your shoulders and lightly holding on to your thighs or your chair. 3. Tilt your head toward your shoulder and hold for 15 to 30 seconds. Let the weight of your head stretch your muscles. 4. If you would like a little added stretch, place your arm behind your back. Use the arm opposite of the direction you are tilting your head. For example, if you are tilting your head to the left, place your right arm behind your back. 5. Repeat 2 to 4 times toward each shoulder. Neck rotation 1. Sit in a firm chair, or stand up straight. 2. Keeping your chin level, turn your head to the right, and hold for 15 to 30 seconds. 3. Turn your head to the left, and hold for 15 to 30 seconds. 4. Repeat 2 to 4 times to each side. Chin tuck 1. Lie on the floor with a rolled-up towel under your neck. Your head should be touching the floor. 2. Slowly bring your chin toward the front of your neck. 3. Hold for a count of 6, and then relax for up to 10 seconds. 4. Repeat 8 to 12 times. Forward neck flexion 1. Sit in a firm chair, or stand up straight. 2. Bend your head forward. 3. Hold for 15 to 30 seconds, then return to your starting position. 4. Repeat 2 to 4 times. Follow-up care is a key part of your treatment and safety. Be sure to make and go to all appointments, and call your doctor if you are having problems. It's also a good idea to know your test results and keep a list of the medicines you take. Where can you learn more? Go to http://james-anirudh.info/. Enter P962 in the search box to learn more about \"Neck Spasm: Exercises. \" Current as of: March 21, 2017 Content Version: 11.4 © 8946-0034 [employer]. Care instructions adapted under license by [employer] (which disclaims liability or warranty for this information). If you have questions about a medical condition or this instruction, always ask your healthcare professional. [address] any warranty or liability for your use of this information. Tizanidine (By mouth) Tizanidine (lfo-TQT-n-deepak) Treats muscle spasticity. Brand Name(s): Comfort Pac w/tiZANidine, Zanaflex, Zanaflex Capsule There may be other brand names for this medicine. When This Medicine Should Not Be Used: This medicine is not right for everyone. Do not use if you had an allergic reaction to tizanidine. How to Use This Medicine:  
Capsule, Tablet · Take your medicine as directed. Your dose may need to be changed several times to find what works best for you. · You may take this medicine with or without food, but always take it the same way every time. Tizanidine works differently depending on whether you take it on an empty stomach or a full stomach. Talk to your doctor if you have any questions about this. · Missed dose: Take a dose as soon as you remember. If it is almost time for your next dose, wait until then and take a regular dose. Do not take extra medicine to make up for a missed dose. · Store the medicine in a closed container at room temperature, away from heat, moisture, and direct light. Drugs and Foods to Avoid: Ask your doctor or pharmacist before using any other medicine, including over-the-counter medicines, vitamins, and herbal products. · Do not use this medicine together with ciprofloxacin or fluvoxamine. · Some foods and medicines can affect how tizanidine works. Tell your doctor if you are using any of the following: ¨ Acyclovir, baclofen, cimetidine, famotidine, ticlopidine, verapamil, zileuton ¨ Birth control pills, blood pressure medicine, medicine for heart rhythm problems (such as amiodarone, mexiletine, propafenone), or medicine to treat an infection (such as levofloxacin, moxifloxacin) · Do not drink alcohol while you are using this medicine. · Tell your doctor if you use anything else that makes you sleepy. Some examples are allergy medicine, narcotic pain medicine, and alcohol. Warnings While Using This Medicine: · Tell your doctor if you are pregnant or breastfeeding, or if you have kidney disease or liver disease. · This medicine may cause the following problems: 
¨ Low blood pressure ¨ Liver damage · This medicine may make you dizzy or drowsy. Do not drive or do anything else that could be dangerous until you know how this medicine affects you. Stand or sit up slowly if you are dizzy. · Do not stop using this medicine suddenly. Your doctor will need to slowly decrease your dose before you stop it completely. · Your doctor will do lab tests at regular visits to check on the effects of this medicine. Keep all appointments. · Keep all medicine out of the reach of children. Never share your medicine with anyone. Possible Side Effects While Using This Medicine:  
Call your doctor right away if you notice any of these side effects: · Allergic reaction: Itching or hives, swelling in your face or hands, swelling or tingling in your mouth or throat, chest tightness, trouble breathing · Dark urine or pale stools, nausea, vomiting, loss of appetite, stomach pain, yellow skin or eyes · Lightheadedness, dizziness, or fainting · Seeing or hearing things that are not really there · Slow heartbeat If you notice these less serious side effects, talk with your doctor: · Dry mouth · Drowsiness or sleepiness · Weakness If you notice other side effects that you think are caused by this medicine, tell your doctor. Call your doctor for medical advice about side effects. You may report side effects to FDA at 9-058-FDA-2006 © 2017 [address] Information is for End User's use only and may not be sold, redistributed or otherwise used for commercial purposes. The above information is an  only. It is not intended as medical advice for individual conditions or treatments. Talk to your doctor, nurse or pharmacist before following any medical regimen to see if it is safe and effective for you. Introducing [facility]! [facility] introduces [employer] patient portal. Now you can access parts of your medical record, email your doctor's office, and request medication refills online. 1. In your internet browser, go to [email or web address] 2. Click on the First Time User? Click Here link in the Sign In box. You will see the New Member Sign Up page. 3. Enter your SinoHub Access Code exactly as it appears below. You will not need to use this code after youve completed the sign-up process. If you do not sign up before the expiration date, you must request a new code. · S[employer]Hub Access Code: [email or web address] Expires: 8/21/2018  8:32 AM 
 
4. Enter the last four digits of your Social Security Number (xxxx) and Date of Birth (mm/dd/yyyy) as indicated and click Submit. You will be taken to the next sign-up page. 5. Create a SinoHub ID. This will be your SinoHub login ID and cannot be changed, so think of one that is secure and easy to remember. 6. Create a S[employer]Hub password. You can change your password at any time. 7. Enter your Password Reset Question and Answer. This can be used at a later time if you forget your password. 8. Enter your e-mail address. You will receive e-mail notification when new information is available in [address] 9. Click Sign Up. You can now view and download portions of your medical record. 10. Click the Download Summary menu link to download a portable copy of your medical information. If you have questions, please visit the Frequently Asked Questions section of the [employer] website. Remember, [employer] is NOT to be used for urgent needs. For medical emergencies, dial 911. Now available from your iPhone and Android! Please provide this summary of care documentation to your next provider. Your primary care clinician is listed as Tyrone Clayton. If you have any questions after today's visit, please call [phone].

## 2018-05-23 NOTE — PROGRESS NOTES
Chief Complaint   Patient presents with    Neck Pain     1. Have you been to the ER, urgent care clinic since your last visit? Hospitalized since your last visit? No    2. Have you seen or consulted any other health care providers outside of the [address] [place] [address] since your last visit? Include any pap smears or colon screening.  No

## 2018-05-23 NOTE — PROGRESS NOTES
Internal Medicine Follow Up Visit Note    Chief Complaint   Patient presents with    Neck Pain       HPI:  Geeta West is a 79 y.o.  female with history significant for HLD, prediabetes is here for the above complaint(s). Neck Pain: Onset last week woke up with pain. No preceding trauma. Pain mild discomfort last night on right side and this morning now pain on left side. Hurts when turning neck dull pain. Tylenol arthritis helped with pain yesterday. Current Outpatient Prescriptions   Medication Sig    tiZANidine (ZANAFLEX) 2 mg tablet Take 1 Tab by mouth two (2) times daily as needed for up to 30 days.  ezetimibe (ZETIA) 10 mg tablet Take 1 Tab by mouth daily for 90 days.  lisinopril (PRINIVIL, ZESTRIL) 10 mg tablet Take 1 Tab by mouth daily for 90 days.  cholecalciferol, VITAMIN D3, (VITAMIN D3) 5,000 unit tab tablet Take  by mouth daily.  acetaminophen (TYLENOL) 500 mg tablet Take 1 Tab by mouth two (2) times a day.  ONETOUCH DELICA LANCETS 30 gauge misc     Blood-Glucose Meter monitoring kit Use to test sugar when symptoms occur    Lancets misc Use when symptoms occur    glucose blood VI test strips (ASCENSIA AUTODISC VI, ONE TOUCH ULTRA TEST VI) strip Use when symptoms occur    Calcium-Cholecalciferol, D3, (CALCIUM 600 WITH VITAMIN D3) 600 mg(1,500mg) -400 unit chew Take  by mouth.  COD LIVER OIL, BULK, by Does Not Apply route. No current facility-administered medications for this visit.       Health Maintenance   Topic Date Due    Bone Densitometry (Dexa) Screening  06/22/2015    Pneumococcal 65+ Low/Medium Risk (2 of 2 - PCV13) 09/14/2016    DTaP/Tdap/Td series (2 - Td) 12/31/2016    BREAST CANCER SCRN MAMMOGRAM  08/09/2018    Influenza Age 9 to Adult  08/01/2018    MEDICARE YEARLY EXAM  08/26/2018    GLAUCOMA SCREENING Q2Y  08/31/2019    COLONOSCOPY  09/14/2020    Hepatitis C Screening  Completed    ZOSTER VACCINE AGE 60>  Completed Immunization History   Administered Date(s) Administered    Influenza Vaccine 09/14/2015    Pneumococcal Polysaccharide (PPSV-23) 09/14/2015       Allergies and Medications: Reviewed and updated in EMR. Patient Active Problem List   Diagnosis Code    Microscopic hematuria R31.29    HLD (hyperlipidemia) E78.5    Vitamin D insufficiency E55.9    HTN (hypertension) I10    Uterine fibroid D25.9    Renal disease N28.9    Hepatic steatosis K76.0    Health care maintenance Z00.00    Colon polyp K63.5    Diverticular disease K57.90    Osteoporosis M81.0    Hearing loss, left H91.92    Environmental allergies Z91.09    Cyst, dermoid, scalp and neck D23.4    Bilateral carpal tunnel syndrome G56.03    Primary localized osteoarthrosis, hand M19.049    Right-sided tinnitus H93.11    DDD (degenerative disc disease), cervical M50.30    Microalbuminuria R80.9    Microcalcification of left breast on mammogram R92.0    Medicare annual wellness visit, subsequent Z00.00    Age-related cataract of both eyes H25.9    Glaucoma of both eyes H40.9    Pre-diabetes R73.03    Ground glass opacity present on imaging of lung R91.8       Family History, Social History, Past Medical History, Surgical History: Reviewed and updated in EMR as appropriate. OBJECTIVE:   Visit Vitals    /80    Pulse 81    Temp 97.8 °F (36.6 °C) (Oral)    Resp 16    Ht 5' 3\" (1.6 m)    Wt 158 lb (71.7 kg)    SpO2 98%  Comment: ra    BMI 27.99 kg/m2        BP Readings from Last 3 Encounters:   05/23/18 162/80   03/05/18 161/85   02/13/18 136/80     Wt Readings from Last 3 Encounters:   05/23/18 158 lb (71.7 kg)   03/05/18 159 lb 9.6 oz (72.4 kg)   02/13/18 155 lb (70.3 kg)       General: Pleasant late middle aged woman in no acute distress  HEENT: Head is atraumatic normo-cephalic. MSK: turning to face left hurts most. PETEY, doesn't hurt with side flexion. Strength of neck and shoulders intact. TTP SCM base left neck. Skin: Dermoid cyst base of skull on left. 2 cm x 3 cm x 1 cm raised. No painful to palpation. Nursing Notes Reviewed. ASSESSMENT/PLAN:      ICD-10-CM ICD-9-CM    1. Neck muscle spasm M62.838 728.85 tiZANidine (ZANAFLEX) 2 mg tablet discussed r/b/se  continue APAP prn  Given neck exercise handout  If not better in 6 weeks consider PT       Requested Prescriptions     Signed Prescriptions Disp Refills    tiZANidine (ZANAFLEX) 2 mg tablet 20 Tab 0     Sig: Take 1 Tab by mouth two (2) times daily as needed for up to 30 days. Patient verbalized understanding and agreement with the plan. Patient was given an after-visit summary. Follow-up Disposition:  Return in about 4 weeks (around 6/20/2018), or if symptoms worsen or fail to improve, for finger stingling. or sooner if worsening symptoms. More than 50% of this 15 min visit was spent counseling the patient face to face about etiology and treatment of health conditions outlined in assessment and plan. Chitra Mendoza M.D.   [place] [phone]  [facility] [phone]

## 2018-07-31 DIAGNOSIS — I10 ESSENTIAL HYPERTENSION: Primary | ICD-10-CM

## 2018-07-31 DIAGNOSIS — E78.5 HYPERLIPIDEMIA, UNSPECIFIED HYPERLIPIDEMIA TYPE: ICD-10-CM

## 2018-07-31 RX ORDER — LISINOPRIL 10 MG/1
10 TABLET ORAL DAILY
Qty: 30 TAB | Refills: 2 | Status: SHIPPED | OUTPATIENT
Start: 2018-07-31 | End: 2018-10-29

## 2018-07-31 RX ORDER — EZETIMIBE 10 MG/1
TABLET ORAL
Qty: 30 TAB | Refills: 2 | Status: SHIPPED | OUTPATIENT
Start: 2018-07-31 | End: 2019-03-20 | Stop reason: SDUPTHER

## 2019-09-25 PROBLEM — Z00.00 MEDICARE ANNUAL WELLNESS VISIT, SUBSEQUENT: Status: RESOLVED | Noted: 2017-08-25 | Resolved: 2019-09-25

## 2020-03-25 ENCOUNTER — HOSPITAL ENCOUNTER (OUTPATIENT)
Dept: NON INVASIVE DIAGNOSTICS | Age: 70
Discharge: HOME | End: 2020-03-25
Attending: FAMILY MEDICINE
Payer: MEDICARE

## 2020-03-25 VITALS
WEIGHT: 158 LBS | SYSTOLIC BLOOD PRESSURE: 162 MMHG | DIASTOLIC BLOOD PRESSURE: 80 MMHG | HEIGHT: 63 IN | BODY MASS INDEX: 28 KG/M2

## 2020-03-25 DIAGNOSIS — R01.1 SYSTOLIC MURMUR: ICD-10-CM

## 2020-03-25 DIAGNOSIS — I10 ESSENTIAL HYPERTENSION: ICD-10-CM

## 2020-03-25 LAB
AV PEAK GRADIENT: 37.19 MMHG
ECHO AO ROOT DIAM: 3.28 CM
ECHO AV REGURGITANT PHT: 365.5 CM
ECHO LA AREA 4C: 18 CM2
ECHO LA VOL 2C: 37.35 ML (ref 22–52)
ECHO LA VOL 4C: 48.24 ML (ref 22–52)
ECHO LA VOL BP: 45.63 ML (ref 22–52)
ECHO LA VOL/BSA BIPLANE: 26.08 ML/M2 (ref 16–28)
ECHO LA VOLUME INDEX A2C: 21.35 ML/M2 (ref 16–28)
ECHO LA VOLUME INDEX A4C: 27.57 ML/M2 (ref 16–28)
ECHO LV E' LATERAL VELOCITY: 10.26 CM/S
ECHO LV E' SEPTAL VELOCITY: 8.78 CM/S
ECHO LV EDV TEICHHOLZ: 63.98 ML
ECHO LV ESV TEICHHOLZ: 14.43 ML
ECHO LV INTERNAL DIMENSION DIASTOLIC: 4.93 CM (ref 3.9–5.3)
ECHO LV INTERNAL DIMENSION SYSTOLIC: 2.65 CM
ECHO LV IVSD: 1.12 CM (ref 0.6–0.9)
ECHO LV MASS 2D: 258.6 G (ref 67–162)
ECHO LV MASS INDEX 2D: 147.8 G/M2 (ref 43–95)
ECHO LV POSTERIOR WALL DIASTOLIC: 1.21 CM (ref 0.6–0.9)
ECHO LVOT DIAM: 2 CM
ECHO LVOT PEAK GRADIENT: 3.8 MMHG
ECHO LVOT PEAK VELOCITY: 97.1 CM/S
ECHO LVOT SV: 53.4 ML
ECHO LVOT VTI: 16.94 CM
ECHO MV A VELOCITY: 111.73 CM/S
ECHO MV E DECELERATION TIME (DT): 190.9 MS
ECHO MV E VELOCITY: 95.2 CM/S
ECHO MV E/A RATIO: 0.85
ECHO MV E/E' LATERAL: 9.28
ECHO MV E/E' RATIO (AVERAGED): 10.06
ECHO MV E/E' SEPTAL: 10.84
ECHO RV TAPSE: 2.1 CM (ref 1.5–2)
LVFS 2D: 46.23 %
LVOT MG: 1.88 MMHG
LVOT MV: 0.63 CM/S
LVSV (TEICH): 49.55 ML
MV DEC SLOPE: 4.99
PISA AR MAX VEL: 304.93 CM/S

## 2020-03-25 PROCEDURE — 93306 TTE W/DOPPLER COMPLETE: CPT

## 2022-03-18 PROBLEM — H25.9 AGE-RELATED CATARACT OF BOTH EYES: Status: ACTIVE | Noted: 2017-09-11

## 2022-03-18 PROBLEM — R73.03 PRE-DIABETES: Status: ACTIVE | Noted: 2017-11-08

## 2022-03-18 PROBLEM — K76.0 HEPATIC STEATOSIS: Status: ACTIVE | Noted: 2017-06-30

## 2022-03-19 PROBLEM — K63.5 COLON POLYP: Status: ACTIVE | Noted: 2017-06-30

## 2022-03-19 PROBLEM — M50.30 DDD (DEGENERATIVE DISC DISEASE), CERVICAL: Status: ACTIVE | Noted: 2017-07-05

## 2022-03-19 PROBLEM — H93.11 RIGHT-SIDED TINNITUS: Status: ACTIVE | Noted: 2017-04-19

## 2022-03-19 PROBLEM — Z00.00 HEALTH CARE MAINTENANCE: Status: ACTIVE | Noted: 2017-06-30

## 2022-03-19 PROBLEM — R92.0 MICROCALCIFICATION OF LEFT BREAST ON MAMMOGRAM: Status: ACTIVE | Noted: 2017-08-10

## 2022-03-19 PROBLEM — H91.92 HEARING LOSS, LEFT: Status: ACTIVE | Noted: 2017-01-01

## 2022-03-19 PROBLEM — K57.90 DIVERTICULAR DISEASE: Status: ACTIVE | Noted: 2017-06-30

## 2022-03-19 PROBLEM — N28.9 RENAL DISEASE: Status: ACTIVE | Noted: 2017-06-30

## 2022-03-19 PROBLEM — R80.9 MICROALBUMINURIA: Status: ACTIVE | Noted: 2017-07-31

## 2022-03-19 PROBLEM — D25.9 UTERINE FIBROID: Status: ACTIVE | Noted: 2017-06-30

## 2022-03-20 PROBLEM — R91.8 GROUND GLASS OPACITY PRESENT ON IMAGING OF LUNG: Status: ACTIVE | Noted: 2018-01-08

## 2022-03-20 PROBLEM — H40.9 GLAUCOMA OF BOTH EYES: Status: ACTIVE | Noted: 2017-09-11

## 2022-03-20 PROBLEM — D23.4 CYST, DERMOID, SCALP AND NECK: Status: ACTIVE | Noted: 2017-06-30

## 2022-08-23 ENCOUNTER — TRANSCRIBE ORDER (OUTPATIENT)
Dept: SCHEDULING | Age: 72
End: 2022-08-23

## 2022-08-23 DIAGNOSIS — M85.89 OTHER SPECIFIED DISORDERS OF BONE DENSITY AND STRUCTURE, MULTIPLE SITES: Primary | ICD-10-CM

## 2023-01-31 DIAGNOSIS — M85.89 OTHER SPECIFIED DISORDERS OF BONE DENSITY AND STRUCTURE, MULTIPLE SITES: Primary | ICD-10-CM

## 2023-02-03 DIAGNOSIS — M85.89 OTHER SPECIFIED DISORDERS OF BONE DENSITY AND STRUCTURE, MULTIPLE SITES: Primary | ICD-10-CM
